# Patient Record
Sex: MALE | Race: WHITE | Employment: OTHER | URBAN - METROPOLITAN AREA
[De-identification: names, ages, dates, MRNs, and addresses within clinical notes are randomized per-mention and may not be internally consistent; named-entity substitution may affect disease eponyms.]

---

## 2017-03-02 RX ORDER — ACETAMINOPHEN 160 MG
5000 TABLET,DISINTEGRATING ORAL DAILY
COMMUNITY
End: 2019-06-03 | Stop reason: DRUGHIGH

## 2017-05-22 PROBLEM — Z79.4 TYPE 2 DIABETES MELLITUS WITH DIABETIC NEUROPATHY, WITH LONG-TERM CURRENT USE OF INSULIN (HCC): Status: ACTIVE | Noted: 2017-05-22

## 2017-05-22 PROBLEM — E11.40 TYPE 2 DIABETES MELLITUS WITH DIABETIC NEUROPATHY, WITH LONG-TERM CURRENT USE OF INSULIN (HCC): Status: ACTIVE | Noted: 2017-05-22

## 2017-05-26 ENCOUNTER — ANESTHESIA EVENT (OUTPATIENT)
Dept: SURGERY | Facility: HOSPITAL | Age: 72
End: 2017-05-26
Payer: MEDICARE

## 2017-06-01 ENCOUNTER — SURGERY (OUTPATIENT)
Age: 72
End: 2017-06-01

## 2017-06-01 ENCOUNTER — ANESTHESIA (OUTPATIENT)
Dept: SURGERY | Facility: HOSPITAL | Age: 72
End: 2017-06-01
Payer: MEDICARE

## 2017-06-01 ENCOUNTER — HOSPITAL ENCOUNTER (OUTPATIENT)
Facility: HOSPITAL | Age: 72
Setting detail: HOSPITAL OUTPATIENT SURGERY
Discharge: HOME | End: 2017-06-01
Attending: OTOLARYNGOLOGY | Admitting: OTOLARYNGOLOGY
Payer: MEDICARE

## 2017-06-01 VITALS
DIASTOLIC BLOOD PRESSURE: 80 MMHG | BODY MASS INDEX: 28.49 KG/M2 | HEART RATE: 59 BPM | WEIGHT: 188 LBS | RESPIRATION RATE: 20 BRPM | HEIGHT: 68 IN | OXYGEN SATURATION: 98 % | SYSTOLIC BLOOD PRESSURE: 127 MMHG | TEMPERATURE: 98 F

## 2017-06-01 DIAGNOSIS — H53.483 BILATERAL VISUAL FIELD CONSTRICTION: ICD-10-CM

## 2017-06-01 DIAGNOSIS — H02.831 DERMATOCHALASIS OF RIGHT UPPER EYELID: ICD-10-CM

## 2017-06-01 DIAGNOSIS — H02.834 DERMATOCHALASIS OF LEFT UPPER EYELID: ICD-10-CM

## 2017-06-01 PROCEDURE — 82962 GLUCOSE BLOOD TEST: CPT

## 2017-06-01 PROCEDURE — 080NXZZ ALTERATION OF RIGHT UPPER EYELID, EXTERNAL APPROACH: ICD-10-PCS | Performed by: OTOLARYNGOLOGY

## 2017-06-01 PROCEDURE — 080PXZZ ALTERATION OF LEFT UPPER EYELID, EXTERNAL APPROACH: ICD-10-PCS | Performed by: OTOLARYNGOLOGY

## 2017-06-01 RX ORDER — DEXTROSE MONOHYDRATE 25 G/50ML
50 INJECTION, SOLUTION INTRAVENOUS
Status: DISCONTINUED | OUTPATIENT
Start: 2017-06-01 | End: 2017-06-01

## 2017-06-01 RX ORDER — ONDANSETRON 2 MG/ML
4 INJECTION INTRAMUSCULAR; INTRAVENOUS AS NEEDED
Status: DISCONTINUED | OUTPATIENT
Start: 2017-06-01 | End: 2017-06-01

## 2017-06-01 RX ORDER — CEPHALEXIN 500 MG/1
500 CAPSULE ORAL 4 TIMES DAILY
Qty: 8 CAPSULE | Refills: 0 | Status: SHIPPED | OUTPATIENT
Start: 2017-06-01 | End: 2017-06-03

## 2017-06-01 RX ORDER — BACITRACIN 500 [USP'U]/G
OINTMENT OPHTHALMIC AS NEEDED
Status: DISCONTINUED | OUTPATIENT
Start: 2017-06-01 | End: 2017-06-01

## 2017-06-01 RX ORDER — DEXTROSE MONOHYDRATE 25 G/50ML
50 INJECTION, SOLUTION INTRAVENOUS
Status: DISCONTINUED | OUTPATIENT
Start: 2017-06-01 | End: 2017-06-01 | Stop reason: HOSPADM

## 2017-06-01 RX ORDER — NALOXONE HYDROCHLORIDE 0.4 MG/ML
80 INJECTION, SOLUTION INTRAMUSCULAR; INTRAVENOUS; SUBCUTANEOUS AS NEEDED
Status: DISCONTINUED | OUTPATIENT
Start: 2017-06-01 | End: 2017-06-01

## 2017-06-01 RX ORDER — MEPERIDINE HYDROCHLORIDE 25 MG/ML
12.5 INJECTION INTRAMUSCULAR; INTRAVENOUS; SUBCUTANEOUS AS NEEDED
Status: DISCONTINUED | OUTPATIENT
Start: 2017-06-01 | End: 2017-06-01

## 2017-06-01 RX ORDER — SODIUM CHLORIDE, SODIUM LACTATE, POTASSIUM CHLORIDE, CALCIUM CHLORIDE 600; 310; 30; 20 MG/100ML; MG/100ML; MG/100ML; MG/100ML
INJECTION, SOLUTION INTRAVENOUS CONTINUOUS
Status: DISCONTINUED | OUTPATIENT
Start: 2017-06-01 | End: 2017-06-01

## 2017-06-01 RX ORDER — LIDOCAINE HYDROCHLORIDE AND EPINEPHRINE 10; 10 MG/ML; UG/ML
INJECTION, SOLUTION INFILTRATION; PERINEURAL AS NEEDED
Status: DISCONTINUED | OUTPATIENT
Start: 2017-06-01 | End: 2017-06-01

## 2017-06-01 RX ORDER — OXYCODONE HYDROCHLORIDE AND ACETAMINOPHEN 5; 325 MG/1; MG/1
TABLET ORAL
Qty: 8 TABLET | Refills: 0 | Status: SHIPPED | OUTPATIENT
Start: 2017-06-01 | End: 2017-06-09 | Stop reason: ALTCHOICE

## 2017-06-01 RX ORDER — HYDROMORPHONE HYDROCHLORIDE 1 MG/ML
0.4 INJECTION, SOLUTION INTRAMUSCULAR; INTRAVENOUS; SUBCUTANEOUS EVERY 5 MIN PRN
Status: DISCONTINUED | OUTPATIENT
Start: 2017-06-01 | End: 2017-06-01

## 2017-06-01 RX ORDER — METOCLOPRAMIDE HYDROCHLORIDE 5 MG/ML
10 INJECTION INTRAMUSCULAR; INTRAVENOUS AS NEEDED
Status: DISCONTINUED | OUTPATIENT
Start: 2017-06-01 | End: 2017-06-01

## 2017-06-01 RX ORDER — HYDROCODONE BITARTRATE AND ACETAMINOPHEN 5; 325 MG/1; MG/1
2 TABLET ORAL AS NEEDED
Status: DISCONTINUED | OUTPATIENT
Start: 2017-06-01 | End: 2017-06-01

## 2017-06-01 RX ORDER — HYDROCODONE BITARTRATE AND ACETAMINOPHEN 5; 325 MG/1; MG/1
1 TABLET ORAL AS NEEDED
Status: DISCONTINUED | OUTPATIENT
Start: 2017-06-01 | End: 2017-06-01

## 2017-06-01 NOTE — OPERATIVE REPORT
[facility]    PATIENT'S NAME: Phong Seth   ATTENDING PHYSICIAN: Richard Perry M.D. OPERATING PHYSICIAN: Richard Perry M.D.    PATIENT ACCOUNT#:   [de-identified]    LOCATION:  [address] [place] [address] 2 EDWP 10  MEDICAL RECORD #:   [id number]       D using ophthalmologic Betadine.   Being on the left eye, I used an 11 blade to make my incision in the inner corner, and then once the incision was started, I changed to a 15 blade and completed the incision along the supratarsal crease and then the superior

## 2017-06-01 NOTE — ANESTHESIA POSTPROCEDURE EVALUATION
[address] Patient Status:  Hospital Outpatient Surgery   Age/Gender 66year old male MRN [id number]   [facility] SURGERY Attending Caitlin Ruth MD   Hosp Day # 0 PCP Phyllis Laureano MD       Anesthesia Post-op Note

## 2017-06-01 NOTE — ANESTHESIA PREPROCEDURE EVALUATION
PRE-OP EVALUATION    Patient Name: Paty Brandon    Pre-op Diagnosis: Bilateral visual field constriction [H53.483]  Dermatochalasis of left upper eyelid [Z38.514]  Dermatochalasis of right upper eyelid [N86.497]    Procedure(s):  BILATERAL UPPER BLEPHA left ventricular branch. 2.         Successful angioplasty and stenting of the right posterior descending artery.     Dictated By Ramiro Singer M.D.  W:     24/18/6984 15:21:38  t:      06/10/2016 07:58:46  [facility]   [id number]  CJG/    ECG Lab Results  Component Value Date    05/15/2017   K 4.6 05/15/2017    05/15/2017   CO2 21.9* 05/15/2017   BUN 17 05/15/2017   CREATSERUM 1.05 05/15/2017   * 05/15/2017            Airway      Mallampati: II  Mouth opening: <3 FB  TM

## 2017-06-01 NOTE — H&P
CHIEF COMPLAINT: Superior visual field cut.      HISTORY OF PRESENT ILLNESS: The patient is here today by the recommendation of his wife, who recommended that he visit with me for a complaint of loss of vision superiorly as well as laterally.  He thinks the RRR  Lungs: CTA       Eyes: He has MRD equal to 1 mm bilaterally. He has extra upper lid hanging over the edge of the lashes bilaterally.  His true lid margin is just above the 12-o'clock position of the pupil bilaterally.       ASSESSMENT & PLAN: Patient w

## 2017-06-01 NOTE — BRIEF OP NOTE
Pre-Operative Diagnosis: Bilateral visual field constriction [H53.483]  Dermatochalasis of left upper eyelid [H02.834]  Dermatochalasis of right upper eyelid [H02.831]     Post-Operative Diagnosis: Bilateral visual field constriction [H53.483]Dermatocha

## 2017-06-09 PROBLEM — H53.483 VISUAL FIELD CONSTRICTION, BILATERAL: Status: ACTIVE | Noted: 2017-06-09

## 2017-11-09 ENCOUNTER — APPOINTMENT (OUTPATIENT)
Dept: CV DIAGNOSTICS | Facility: HOSPITAL | Age: 72
DRG: 251 | End: 2017-11-09
Attending: HOSPITALIST
Payer: MEDICARE

## 2017-11-09 ENCOUNTER — APPOINTMENT (OUTPATIENT)
Dept: GENERAL RADIOLOGY | Facility: HOSPITAL | Age: 72
DRG: 251 | End: 2017-11-09
Payer: MEDICARE

## 2017-11-09 ENCOUNTER — HOSPITAL ENCOUNTER (OUTPATIENT)
Facility: HOSPITAL | Age: 72
Setting detail: OBSERVATION
Discharge: HOME | DRG: 251 | End: 2017-11-10
Attending: EMERGENCY MEDICINE | Admitting: HOSPITALIST
Payer: MEDICARE

## 2017-11-09 DIAGNOSIS — R55 SYNCOPE, NEAR: Primary | ICD-10-CM

## 2017-11-09 PROCEDURE — 85025 COMPLETE CBC W/AUTO DIFF WBC: CPT

## 2017-11-09 PROCEDURE — 84484 ASSAY OF TROPONIN QUANT: CPT | Performed by: HOSPITALIST

## 2017-11-09 PROCEDURE — 83036 HEMOGLOBIN GLYCOSYLATED A1C: CPT | Performed by: HOSPITALIST

## 2017-11-09 PROCEDURE — 85378 FIBRIN DEGRADE SEMIQUANT: CPT | Performed by: EMERGENCY MEDICINE

## 2017-11-09 PROCEDURE — 99285 EMERGENCY DEPT VISIT HI MDM: CPT

## 2017-11-09 PROCEDURE — 93005 ELECTROCARDIOGRAM TRACING: CPT

## 2017-11-09 PROCEDURE — 80053 COMPREHEN METABOLIC PANEL: CPT

## 2017-11-09 PROCEDURE — 87493 C DIFF AMPLIFIED PROBE: CPT | Performed by: HOSPITALIST

## 2017-11-09 PROCEDURE — 82962 GLUCOSE BLOOD TEST: CPT

## 2017-11-09 PROCEDURE — 71010 XR CHEST AP PORTABLE  (CPT=71010): CPT

## 2017-11-09 PROCEDURE — 84484 ASSAY OF TROPONIN QUANT: CPT

## 2017-11-09 PROCEDURE — 96360 HYDRATION IV INFUSION INIT: CPT

## 2017-11-09 PROCEDURE — 93010 ELECTROCARDIOGRAM REPORT: CPT

## 2017-11-09 PROCEDURE — 80053 COMPREHEN METABOLIC PANEL: CPT | Performed by: EMERGENCY MEDICINE

## 2017-11-09 PROCEDURE — 84484 ASSAY OF TROPONIN QUANT: CPT | Performed by: EMERGENCY MEDICINE

## 2017-11-09 PROCEDURE — 96372 THER/PROPH/DIAG INJ SC/IM: CPT

## 2017-11-09 PROCEDURE — 93306 TTE W/DOPPLER COMPLETE: CPT | Performed by: HOSPITALIST

## 2017-11-09 PROCEDURE — 83690 ASSAY OF LIPASE: CPT | Performed by: EMERGENCY MEDICINE

## 2017-11-09 PROCEDURE — 85025 COMPLETE CBC W/AUTO DIFF WBC: CPT | Performed by: EMERGENCY MEDICINE

## 2017-11-09 RX ORDER — DEXTROSE MONOHYDRATE 25 G/50ML
50 INJECTION, SOLUTION INTRAVENOUS
Status: DISCONTINUED | OUTPATIENT
Start: 2017-11-09 | End: 2017-11-10

## 2017-11-09 RX ORDER — PRAVASTATIN SODIUM 10 MG
10 TABLET ORAL NIGHTLY
Status: DISCONTINUED | OUTPATIENT
Start: 2017-11-09 | End: 2017-11-10

## 2017-11-09 RX ORDER — ACETAMINOPHEN 325 MG/1
650 TABLET ORAL EVERY 6 HOURS PRN
Status: DISCONTINUED | OUTPATIENT
Start: 2017-11-09 | End: 2017-11-10

## 2017-11-09 RX ORDER — AMLODIPINE BESYLATE 5 MG/1
10 TABLET ORAL DAILY
Status: DISCONTINUED | OUTPATIENT
Start: 2017-11-09 | End: 2017-11-10

## 2017-11-09 RX ORDER — ONDANSETRON 2 MG/ML
4 INJECTION INTRAMUSCULAR; INTRAVENOUS EVERY 6 HOURS PRN
Status: DISCONTINUED | OUTPATIENT
Start: 2017-11-09 | End: 2017-11-10

## 2017-11-09 RX ORDER — ENOXAPARIN SODIUM 100 MG/ML
40 INJECTION SUBCUTANEOUS DAILY
Status: DISCONTINUED | OUTPATIENT
Start: 2017-11-09 | End: 2017-11-10

## 2017-11-09 RX ORDER — ASPIRIN 81 MG/1
324 TABLET, CHEWABLE ORAL ONCE
Status: DISCONTINUED | OUTPATIENT
Start: 2017-11-09 | End: 2017-11-10

## 2017-11-09 RX ORDER — CLOPIDOGREL BISULFATE 75 MG/1
75 TABLET ORAL
Status: DISCONTINUED | OUTPATIENT
Start: 2017-11-09 | End: 2017-11-10

## 2017-11-09 RX ORDER — LOPERAMIDE HYDROCHLORIDE 2 MG/1
2 CAPSULE ORAL 4 TIMES DAILY PRN
Status: DISCONTINUED | OUTPATIENT
Start: 2017-11-09 | End: 2017-11-10

## 2017-11-09 RX ORDER — LISINOPRIL 40 MG/1
40 TABLET ORAL
Status: DISCONTINUED | OUTPATIENT
Start: 2017-11-09 | End: 2017-11-10

## 2017-11-09 RX ORDER — DIPHENHYDRAMINE HCL 25 MG
25 CAPSULE ORAL NIGHTLY PRN
Status: DISCONTINUED | OUTPATIENT
Start: 2017-11-09 | End: 2017-11-10

## 2017-11-09 RX ORDER — METOPROLOL TARTRATE 50 MG/1
50 TABLET, FILM COATED ORAL
Status: DISCONTINUED | OUTPATIENT
Start: 2017-11-09 | End: 2017-11-10

## 2017-11-09 NOTE — ED INITIAL ASSESSMENT (HPI)
Pt with several episodes of lightheadedness, sweating and diarrhea over yesterday and last noc. Denies chest pain, but may have tightness.

## 2017-11-09 NOTE — PROGRESS NOTES
Pt stated that he had a similar episode to what brought him to the ED. It occurred from 1425 to 1445 per pt.  Orthostatic BP obtained, WNL

## 2017-11-09 NOTE — PROGRESS NOTES
11/09/17 1705 11/09/17 1709 11/09/17 1712   Vital Signs   Pulse 55 68 --    /67 139/65 141/65   BP Location Right arm Right arm Right arm   BP Method Automatic Automatic Automatic   Patient Position Lying Sitting Standing     After episode of ligh

## 2017-11-09 NOTE — CONSULTS
[facility] Cardiology Consultation    Tonya Miller Patient Status:  Observation    1945 MRN [id number]   [facility] 8NE-A Attending Ray Holliday, [address] [facility] Day # 0 PCP Jaime Alexis MD     Reason for Consultation:  Near syncope      Hi glasses     Past Surgical History:  No date: ANGIOGRAM  No date: ANGIOPLASTY (CORONARY)  No date: BYPASS SURGERY  8/31/2001: CABG      Comment: x 3-4  No date: CATH BARE METAL STENT (BMS)      Comment: x 8 stents total combination of bare metal and he does not drink alcohol or use drugs. Review of Systems:  All systems were reviewed and are negative except as described above in HPI.     Physical Exam:      Wt Readings from Last 3 Encounters:  11/09/17 : 180 lb (81.6 kg)  06/01/17 : 188 lb (85.3 kg) RFA     Cath /PCI 6/2016:        RESULTS:     PTCA/STENT MID PLVB , 95% TO 0% WITH 2.5 X 20 MM PROMUS DE STENT      PTCA/STENT RPDA, 99% TO 0% WITH 2.25 X 16 MM PROMUS DE STENT         Findings     LVEF: 60%     LM: NORMAL     LCx: 1005 PROX     LAD: 100%

## 2017-11-09 NOTE — PLAN OF CARE
Admit from the ED with near syncope. Had 4 episodes of diarrhea at home. Pt alert and oriented. On room air. Up ad yesi. Stool negative for c diff. Orthostatic negative. 2D echo completed, EF 60%. Possible d/c tomorrow. Will continue to monitor.

## 2017-11-09 NOTE — H&P
[facility] Hospitalist History and Physical      Patient presents with:   Other       PCP: Silvino Crowe MD      History of Present Illness: Patient is a 67year old male with PMH sig for CAD s/p cabg and PCI, HTN, HL, gerd, and DM2,  Who presents to ER with pre-sy ENDOSCOPY  No date: COLONOSCOPY  5/13/2015: LAPAROSCOPIC CHOLECYSTECTOMY N/A      Comment: Procedure: LAPAROSCOPIC CHOLECYSTECTOMY;                 Surgeon: Margarita Izaguirre;   Location: [facility] MAIN OR  2008: OTHER SURGICAL HISTORY      Comment: ercp x 3 with rem bilaterally. Normal effort   Chest wall:  No tenderness or deformity   Heart:  Regular rate and rhythm, S1, S2 normal, no murmur, rub or gallop appreciated   Abdomen:   Soft, non-tender. Bowel sounds normal. No masses,  No organomegaly.  Non distended   Ext Assessment/Plan:     #Pre-syncope  -on tele  -echo pending  -trop wnl  -cards following  -may be 2/2 dehydration vs vasovagal episode  -S/p IVF, encourage fluids oral    #Soft stool?  Diarrhea  -check cdiff if recurs  -possible viral syndrome vs diet re

## 2017-11-09 NOTE — ED PROVIDER NOTES
Patient Seen in: [facility] Emergency Department    History   Patient presents with:   Other    Stated Complaint:     HPI    The patient is a 67-year-old male with an extensive cardiac history including CABG in the distant past, and 8 stents, who is diverticulosis, int hem  4/2012: COLONOSCOPY      Comment: diverticulosis  7/23/15: COLONOSCOPY      Comment: diverticulosis and enlarged internal                hemorrhoids  7/23/2015: COLONOSCOPY N/A      Comment: Procedure: COLONOSCOPY;  Surgeon: Brenda Baum without any distress or retractions. Clear to auscultation bilaterally no wheezes or rales  Abdomen: Normoactive bowel sounds. Soft, nondistended. No HSM or masses. Nontender throughout abdomen. No CVA tenderness.   Extremities: No deformity, nontender thro axes as noted on EKG Report.   Rate: 68  Rhythm: Sinus Rhythm  Reading: Normal sinus rhythm with first-degree AV block with SC interval 222 ms, QRS duration 110 ms, with a right bundle branch block, normal axis, without any ST segment elevations or depressi

## 2017-11-10 VITALS
WEIGHT: 180 LBS | HEIGHT: 68 IN | DIASTOLIC BLOOD PRESSURE: 61 MMHG | SYSTOLIC BLOOD PRESSURE: 144 MMHG | RESPIRATION RATE: 16 BRPM | OXYGEN SATURATION: 96 % | HEART RATE: 83 BPM | BODY MASS INDEX: 27.28 KG/M2 | TEMPERATURE: 98 F

## 2017-11-10 PROCEDURE — 80048 BASIC METABOLIC PNL TOTAL CA: CPT | Performed by: HOSPITALIST

## 2017-11-10 PROCEDURE — 83735 ASSAY OF MAGNESIUM: CPT | Performed by: HOSPITALIST

## 2017-11-10 PROCEDURE — 82962 GLUCOSE BLOOD TEST: CPT

## 2017-11-10 PROCEDURE — 96372 THER/PROPH/DIAG INJ SC/IM: CPT

## 2017-11-10 PROCEDURE — 85025 COMPLETE CBC W/AUTO DIFF WBC: CPT | Performed by: HOSPITALIST

## 2017-11-10 RX ORDER — METOPROLOL TARTRATE 50 MG/1
50 TABLET, FILM COATED ORAL
Status: DISCONTINUED | OUTPATIENT
Start: 2017-11-10 | End: 2017-11-10

## 2017-11-10 NOTE — PROGRESS NOTES
[facility] Cardiology Progress Note        Young Duty Patient Status:  Observation    1945 MRN [id number]   [facility] 8NE-A Attending Maria Alejandra Curran, DO   Hosp Day # 0 PCP Carol López MD     Subjective: 144/61      Temp: 97.5 °F (36.4 °C)  Pulse: 83  Resp: 16  BP: 144/61  General:  Appears comfortable  HEENT: No focal deficits. Neck: No JVD, carotids 2+ no bruits. Cardiac: Regular S1S2. No S3, S4, rub, click. No murmur.   Lungs: Clear to auscultation a new     CXR, 11/9/2017:       Cath/PCI 5/2016:     Cardiac Cath: 5/31/16:     Procedure: LHC, CORONARY ANGIO, LV ANGIO, SVG ANGIO, LIMA ANGIO, IC ADENOSINE, IC NTG, CRISTÓBAL SVG ---> DIAG, ANGIOMAX,   PERCLOSE RFA     Cath /PCI 6/2016:        RESULTS:     PTCA/

## 2017-11-10 NOTE — PROGRESS NOTES
NURSING DISCHARGE NOTE    Discharged Home via Ambulatory. Accompanied by RN  Belongings Taken by patient/family. Pt alert and oriented. IV and tele removed. Discharge instructions and handouts given and discussed.  Medications and future appointment

## 2017-11-10 NOTE — DISCHARGE SUMMARY
General Medicine Discharge Summary     Patient ID:  Chrystal Oppenheim  67year old  7/18/1945    Admit date: 11/9/2017    Discharge date and time:11/10/17    Attending Physician: DO Bess Russo daily. AmLODIPine Besylate 5 MG Oral Tab  Take 10 mg by mouth daily.       Metoprolol Tartrate 100 MG Oral Tab  1/2 TABLET TWICE DAILY    Lovastatin 20 MG Oral Tab  1/2 TABLET DAILY AT breakfast    Lisinopril 40 MG Oral Tab  1 TABLET DAILY    NOVOLOG 100

## 2017-11-10 NOTE — PLAN OF CARE
CARDIOVASCULAR - ADULT    • Maintains optimal cardiac output and hemodynamic stability Progressing    • Absence of cardiac arrhythmias or at baseline Progressing        Tele monitoring. I/o. Gluco scan qid. Pt ambulating in hallway without difficulty.  Svetlana Madrigal

## 2017-11-10 NOTE — PLAN OF CARE
CARDIOVASCULAR - ADULT    • Maintains optimal cardiac output and hemodynamic stability Progressing    • Absence of cardiac arrhythmias or at baseline Progressing    Pt on tele, NSR on monitor. EF 60% on echo yesterday. Pt alert and oriented.  On room

## 2017-11-11 ENCOUNTER — HOSPITAL ENCOUNTER (INPATIENT)
Facility: HOSPITAL | Age: 72
LOS: 3 days | Discharge: HOME | DRG: 251 | End: 2017-11-14
Attending: EMERGENCY MEDICINE | Admitting: INTERNAL MEDICINE
Payer: MEDICARE

## 2017-11-11 ENCOUNTER — APPOINTMENT (OUTPATIENT)
Dept: GENERAL RADIOLOGY | Facility: HOSPITAL | Age: 72
DRG: 251 | End: 2017-11-11
Attending: EMERGENCY MEDICINE
Payer: MEDICARE

## 2017-11-11 DIAGNOSIS — R07.9 ACUTE CHEST PAIN: Primary | ICD-10-CM

## 2017-11-11 PROCEDURE — 93010 ELECTROCARDIOGRAM REPORT: CPT

## 2017-11-11 PROCEDURE — 84484 ASSAY OF TROPONIN QUANT: CPT | Performed by: EMERGENCY MEDICINE

## 2017-11-11 PROCEDURE — 99285 EMERGENCY DEPT VISIT HI MDM: CPT

## 2017-11-11 PROCEDURE — 85610 PROTHROMBIN TIME: CPT | Performed by: EMERGENCY MEDICINE

## 2017-11-11 PROCEDURE — 85025 COMPLETE CBC W/AUTO DIFF WBC: CPT | Performed by: EMERGENCY MEDICINE

## 2017-11-11 PROCEDURE — 82962 GLUCOSE BLOOD TEST: CPT

## 2017-11-11 PROCEDURE — 36415 COLL VENOUS BLD VENIPUNCTURE: CPT

## 2017-11-11 PROCEDURE — 83880 ASSAY OF NATRIURETIC PEPTIDE: CPT | Performed by: EMERGENCY MEDICINE

## 2017-11-11 PROCEDURE — 83036 HEMOGLOBIN GLYCOSYLATED A1C: CPT | Performed by: INTERNAL MEDICINE

## 2017-11-11 PROCEDURE — 93005 ELECTROCARDIOGRAM TRACING: CPT

## 2017-11-11 PROCEDURE — 80053 COMPREHEN METABOLIC PANEL: CPT | Performed by: EMERGENCY MEDICINE

## 2017-11-11 PROCEDURE — 85730 THROMBOPLASTIN TIME PARTIAL: CPT | Performed by: EMERGENCY MEDICINE

## 2017-11-11 PROCEDURE — 71010 XR CHEST AP PORTABLE  (CPT=71010): CPT | Performed by: EMERGENCY MEDICINE

## 2017-11-11 RX ORDER — ASPIRIN 81 MG/1
81 TABLET, CHEWABLE ORAL DAILY
Status: DISCONTINUED | OUTPATIENT
Start: 2017-11-11 | End: 2017-11-14

## 2017-11-11 RX ORDER — DIPHENHYDRAMINE HCL 25 MG
25 CAPSULE ORAL NIGHTLY PRN
Status: DISCONTINUED | OUTPATIENT
Start: 2017-11-11 | End: 2017-11-14

## 2017-11-11 RX ORDER — DEXTROSE MONOHYDRATE 25 G/50ML
50 INJECTION, SOLUTION INTRAVENOUS
Status: DISCONTINUED | OUTPATIENT
Start: 2017-11-11 | End: 2017-11-12

## 2017-11-11 RX ORDER — LISINOPRIL 40 MG/1
40 TABLET ORAL
Status: DISCONTINUED | OUTPATIENT
Start: 2017-11-11 | End: 2017-11-14

## 2017-11-11 RX ORDER — METOPROLOL TARTRATE 50 MG/1
50 TABLET, FILM COATED ORAL
Status: DISCONTINUED | OUTPATIENT
Start: 2017-11-11 | End: 2017-11-14

## 2017-11-11 RX ORDER — CLOPIDOGREL BISULFATE 75 MG/1
75 TABLET ORAL
Status: DISCONTINUED | OUTPATIENT
Start: 2017-11-11 | End: 2017-11-14

## 2017-11-11 RX ORDER — AMLODIPINE BESYLATE 5 MG/1
10 TABLET ORAL DAILY
Status: DISCONTINUED | OUTPATIENT
Start: 2017-11-11 | End: 2017-11-14

## 2017-11-11 RX ORDER — ACETAMINOPHEN 325 MG/1
650 TABLET ORAL EVERY 6 HOURS PRN
Status: DISCONTINUED | OUTPATIENT
Start: 2017-11-11 | End: 2017-11-14

## 2017-11-11 RX ORDER — PRAVASTATIN SODIUM 10 MG
10 TABLET ORAL NIGHTLY
Status: DISCONTINUED | OUTPATIENT
Start: 2017-11-11 | End: 2017-11-14

## 2017-11-11 RX ORDER — INSULIN ASPART 100 [IU]/ML
15 INJECTION, SOLUTION INTRAVENOUS; SUBCUTANEOUS
Status: DISCONTINUED | OUTPATIENT
Start: 2017-11-12 | End: 2017-11-14

## 2017-11-11 NOTE — ED PROVIDER NOTES
Patient Seen in: [facility] Emergency Department    History   Patient presents with:  Dizziness (neurologic)    Stated Complaint: dizzy lightheaded was admitted just d/c with holter monitor     HPI    43-year-old male presents emergency room with ch History:  No date: ANGIOGRAM  No date: ANGIOPLASTY (CORONARY)  No date: BYPASS SURGERY  8/31/2001: CABG      Comment: x 3-4  No date: CATH BARE METAL STENT (BMS)      Comment: x 8 stents total combination of bare metal and               drug eluting  2006: Exam    GENERAL: Patient is awake, alert, well-appearing, in no acute distress. HEENT:  no scleral icterus. Mucous membranes are moist, oropharynx is clear, uvula midline. Scalp is atraumatic. NECK: Neck is supple, there is no nuchal rigidity.   No galeas -----------         ------                     CBC W/ DIFFERENTIAL[[id number]]          Abnormal            Final result                 Please view results for these tests on the individual orders.    [address]

## 2017-11-11 NOTE — ED INITIAL ASSESSMENT (HPI)
Last night was \"lightheaded, broke out in sweats, and felt like elephant sitting on chest\" lasted for 20 minutes. Had another episode today at 1:30 pm that lasted for 20 minutes.  Was told to come to ER for eval. Recently discharged and given a holter mon

## 2017-11-11 NOTE — ED NOTES
Report given to QUINTON Solano. Transport paged. Pt in no distress. Pt updated and plan of care discussed.

## 2017-11-11 NOTE — ED NOTES
Portable chest in process. Report received from QUINTON Stahl. Pt in no distress, vitals are stable.

## 2017-11-12 PROCEDURE — 80048 BASIC METABOLIC PNL TOTAL CA: CPT | Performed by: INTERNAL MEDICINE

## 2017-11-12 PROCEDURE — 83735 ASSAY OF MAGNESIUM: CPT | Performed by: INTERNAL MEDICINE

## 2017-11-12 PROCEDURE — 85025 COMPLETE CBC W/AUTO DIFF WBC: CPT | Performed by: INTERNAL MEDICINE

## 2017-11-12 PROCEDURE — 84484 ASSAY OF TROPONIN QUANT: CPT | Performed by: INTERNAL MEDICINE

## 2017-11-12 PROCEDURE — 82962 GLUCOSE BLOOD TEST: CPT

## 2017-11-12 RX ORDER — MIRTAZAPINE 15 MG/1
15 TABLET, FILM COATED ORAL NIGHTLY
Status: DISCONTINUED | OUTPATIENT
Start: 2017-11-12 | End: 2017-11-14

## 2017-11-12 RX ORDER — SODIUM CHLORIDE 9 MG/ML
INJECTION, SOLUTION INTRAVENOUS CONTINUOUS
Status: DISCONTINUED | OUTPATIENT
Start: 2017-11-12 | End: 2017-11-13

## 2017-11-12 RX ORDER — DEXTROSE MONOHYDRATE 25 G/50ML
50 INJECTION, SOLUTION INTRAVENOUS
Status: DISCONTINUED | OUTPATIENT
Start: 2017-11-12 | End: 2017-11-14

## 2017-11-12 RX ORDER — SODIUM PHOSPHATE, DIBASIC AND SODIUM PHOSPHATE, MONOBASIC 7; 19 G/133ML; G/133ML
1 ENEMA RECTAL ONCE AS NEEDED
Status: COMPLETED | OUTPATIENT
Start: 2017-11-12 | End: 2017-11-12

## 2017-11-12 NOTE — PLAN OF CARE
CARDIOVASCULAR - ADULT    • Absence of cardiac arrhythmias or at baseline Progressing        Patient/Family Goals    • Patient/Family Long Term Goal Progressing    • Patient/Family Short Term Goal Progressing        Cardiac monitor shows. ... SR/SB  Alert an

## 2017-11-12 NOTE — PROGRESS NOTES
[facility] Hospitalist Progress Note                                                                   [address]  7/18/1945    SUBJECTIVE:  He is cp free. Denies sob. Sitting up.       OBJE Continuous Infusions:   • sodium chloride       PRN: glucose **OR** Glucose-Vitamin C **OR** dextrose 50% **OR** glucose **OR** Glucose-Vitamin C, acetaminophen, diphenhydrAMINE    Assessment/Plan:  Principal Problem:    Acute chest pain        # chest

## 2017-11-12 NOTE — H&P
[facility] Hospitalist History and Physical      Patient presents with:  Dizziness (neurologic)       PCP: Sonja Veliz MD      History of Present Illness: 67year old male with PMH sig for CAD s/p cabg and PCI, HTN, HL, gerd, and DM2,  Who presents to ER with ch COLONOSCOPY N/A      Comment: Procedure: COLONOSCOPY;  Surgeon: Chadwick Ayon MD;  Location: [address] [place] [address]  No date: COLONOSCOPY  5/13/2015: LAPAROSCOPIC CHOLECYSTECTOMY N/A      Comment: Procedure: LAPAROSCOPIC CHOLECYSTECTOMY; Normocephalic, without obvious abnormality, atraumatic. Eyes:  Sclera anicteric, No conjunctival pallor, EOMs intact. Nose: Nares normal. Septum midline. Mucosa normal. No drainage.    Throat: Lips, mucosa, and tongue normal. Teeth and gums normal.   N chest.  Recently given a holter monitor yesterday. CONCLUSION:  Multiple old left-sided rib fractures. There is pleural-parenchymal opacity in the left lung laterally. Atelectasis/scarring retrocardiac left lung base has increased slightly.   Heart

## 2017-11-12 NOTE — CONSULTS
[facility] Card[facility]  Consultation Note      Tonya Lamonttripp Patient Status:  Observation    1945 MRN [id number]   [facility] 8NE-A Attending Tomi Salgado MD   Hosp Day # 0 PCP Jaime Alexis MD     ADDENDUM:  The pa artery.    An angiogram in August 2007, done because of chest discomfort   demonstrated an occluded obtuse marginal artery that fills by collaterals during RCA injections, nonobstructive plaquing in the ramus intermedius, occluded LAD which has a patent VALDEZ oral liquid 30 g 30 g Oral Q15 Min PRN   Or      Glucose-Vitamin C (DEX-4) 4-0.006 g chewable tab 8 tablet 8 tablet Oral Q15 Min PRN   Insulin Aspart Pen (NOVOLOG) 100 UNIT/ML flexpen 4-20 Units 4-20 Units Subcutaneous TID CC and HS   diphenhydrAMINE (KELLY removal of pancreatic duct                stones  No date: TONSILLECTOMY    Family History  family history includes Cancer in his father and mother; Diabetes in his brother; Heart Disorder in his father and mother.     Social History   reports that he quit normally  Psychiatric: Normal mood and affect; answers questions appropriately  Dermatologic: No rashes; normal skin turgor    Diagnostic testing:    EKG: SB with 1st degree AV block, RBBB and left atrial enlargement    Labs:     Lab Results  Component Kenna

## 2017-11-12 NOTE — PLAN OF CARE
CARDIOVASCULAR - ADULT    • Absence of cardiac arrhythmias or at baseline Progressing          Assumed care of pt at 0700. Denies any chest pain or SOB. Sinus maine HR 50s to sinus rhythm. Saturating well on RA. Plan for angiogram 11/13. Consent signed.

## 2017-11-13 ENCOUNTER — APPOINTMENT (OUTPATIENT)
Dept: INTERVENTIONAL RADIOLOGY/VASCULAR | Facility: HOSPITAL | Age: 72
DRG: 251 | End: 2017-11-13
Attending: NURSE PRACTITIONER
Payer: MEDICARE

## 2017-11-13 PROCEDURE — 85610 PROTHROMBIN TIME: CPT | Performed by: INTERNAL MEDICINE

## 2017-11-13 PROCEDURE — B2121ZZ FLUOROSCOPY OF SINGLE CORONARY ARTERY BYPASS GRAFT USING LOW OSMOLAR CONTRAST: ICD-10-PCS | Performed by: INTERNAL MEDICINE

## 2017-11-13 PROCEDURE — 99152 MOD SED SAME PHYS/QHP 5/>YRS: CPT

## 2017-11-13 PROCEDURE — 99153 MOD SED SAME PHYS/QHP EA: CPT

## 2017-11-13 PROCEDURE — B2111ZZ FLUOROSCOPY OF MULTIPLE CORONARY ARTERIES USING LOW OSMOLAR CONTRAST: ICD-10-PCS | Performed by: INTERNAL MEDICINE

## 2017-11-13 PROCEDURE — 93005 ELECTROCARDIOGRAM TRACING: CPT

## 2017-11-13 PROCEDURE — B2181ZZ FLUOROSCOPY OF LEFT INTERNAL MAMMARY BYPASS GRAFT USING LOW OSMOLAR CONTRAST: ICD-10-PCS | Performed by: INTERNAL MEDICINE

## 2017-11-13 PROCEDURE — 3E033GC INTRODUCTION OF OTHER THERAPEUTIC SUBSTANCE INTO PERIPHERAL VEIN, PERCUTANEOUS APPROACH: ICD-10-PCS | Performed by: INTERNAL MEDICINE

## 2017-11-13 PROCEDURE — 02703ZZ DILATION OF CORONARY ARTERY, ONE ARTERY, PERCUTANEOUS APPROACH: ICD-10-PCS | Performed by: INTERNAL MEDICINE

## 2017-11-13 PROCEDURE — 93459 L HRT ART/GRFT ANGIO: CPT

## 2017-11-13 PROCEDURE — 85730 THROMBOPLASTIN TIME PARTIAL: CPT | Performed by: INTERNAL MEDICINE

## 2017-11-13 PROCEDURE — 4A023N7 MEASUREMENT OF CARDIAC SAMPLING AND PRESSURE, LEFT HEART, PERCUTANEOUS APPROACH: ICD-10-PCS | Performed by: INTERNAL MEDICINE

## 2017-11-13 PROCEDURE — 93010 ELECTROCARDIOGRAM REPORT: CPT | Performed by: INTERNAL MEDICINE

## 2017-11-13 PROCEDURE — B2151ZZ FLUOROSCOPY OF LEFT HEART USING LOW OSMOLAR CONTRAST: ICD-10-PCS | Performed by: INTERNAL MEDICINE

## 2017-11-13 PROCEDURE — 82962 GLUCOSE BLOOD TEST: CPT

## 2017-11-13 RX ORDER — LIDOCAINE HYDROCHLORIDE 10 MG/ML
INJECTION, SOLUTION INFILTRATION; PERINEURAL
Status: COMPLETED
Start: 2017-11-13 | End: 2017-11-13

## 2017-11-13 RX ORDER — MIDAZOLAM HYDROCHLORIDE 1 MG/ML
INJECTION INTRAMUSCULAR; INTRAVENOUS
Status: COMPLETED
Start: 2017-11-13 | End: 2017-11-13

## 2017-11-13 RX ORDER — HEPARIN SODIUM 5000 [USP'U]/ML
INJECTION, SOLUTION INTRAVENOUS; SUBCUTANEOUS
Status: COMPLETED
Start: 2017-11-13 | End: 2017-11-13

## 2017-11-13 RX ORDER — SODIUM CHLORIDE 9 MG/ML
INJECTION, SOLUTION INTRAVENOUS CONTINUOUS
Status: DISCONTINUED | OUTPATIENT
Start: 2017-11-13 | End: 2017-11-14

## 2017-11-13 RX ORDER — CLOPIDOGREL BISULFATE 75 MG/1
75 TABLET ORAL DAILY
Status: DISCONTINUED | OUTPATIENT
Start: 2017-11-14 | End: 2017-11-13

## 2017-11-13 RX ORDER — BISACODYL 10 MG
10 SUPPOSITORY, RECTAL RECTAL
Status: DISCONTINUED | OUTPATIENT
Start: 2017-11-13 | End: 2017-11-14

## 2017-11-13 RX ORDER — SODIUM CHLORIDE 9 MG/ML
INJECTION, SOLUTION INTRAVENOUS CONTINUOUS
Status: ACTIVE | OUTPATIENT
Start: 2017-11-13 | End: 2017-11-13

## 2017-11-13 NOTE — PROGRESS NOTES
[facility] Hospitalist Progress Note                                                                   [address]  7/18/1945    SUBJECTIVE:  Doing well. No cp/sob/dizziness.  Most major compla Subcutaneous TID CC   • Clopidogrel Bisulfate  75 mg Oral Daily     Continuous Infusions:   • sodium chloride 20 mL/hr at 11/13/17 6784   • sodium chloride 125 mL/hr at 11/13/17 1221   • sodium chloride       PRN: glucose **OR** Glucose-Vitamin C **OR** de

## 2017-11-13 NOTE — PROGRESS NOTES
[facility] Progress Note        Skinny Baronover Patient Status:  Inpatient    1945 MRN [id number]   [facility] 8NE-A Attending Tanner Chavez MD   [facility] Day # 2 PCP Silvino Crowe MD     Subjective murmur. Lungs: Clear to auscultation and percussion. Abdomen: Soft, non-tender. Extremities: No LE edema. No clubbing or cyanosis. Neurologic: Alert and oriented, normal affect. Skin: Warm and dry.            LABS:      HEM:  Recent Labs   Lab  11/ [facility] RFA     Cath /PCI 6/2016:        RESULTS:     PTCA/STENT MID PLVB , 95% TO 0% WITH 2.5 X 20 MM PROMUS DE STENT      PTCA/STENT RPDA, 99% TO 0% WITH 2.25 X 16 MM PROMUS DE STENT         Findings     LVEF: 60%     LM: NORMAL     LCx: 1005 PROX

## 2017-11-13 NOTE — PLAN OF CARE
Problem: Patient/Family Goals  Goal: Patient/Family Short Term Goal  Patient's Short Term Goal: Relief of constipation    Interventions:   - Encourage ambulation  - Fleets enema  Outcome: Not Progressing

## 2017-11-13 NOTE — PLAN OF CARE
Problem: Patient/Family Goals  Goal: Patient/Family Long Term Goal  Patient's Long Term Goal: Remain out of the hospital.    Interventions:  - Follow up with MD appointments. - Take medications as prescribed.    Outcome: Progressing    Goal: Patient/Fami

## 2017-11-13 NOTE — PLAN OF CARE
Assumed care of patient at 1900. Patient A/Ox3, very pleasant. Recently admitted 11/9 for presyncope related to loose stools. D/C 11/10 with holter monitor.   Patient returned Sat after complaints of chest pain, like an elephant on his chest associated w

## 2017-11-13 NOTE — PROCEDURES
[facility]    PATIENT'S NAME: Sandra Poser   ATTENDING PHYSICIAN: Ivonne Szymanski M.D. OPERATING PHYSICIAN: Stacie Grady M.D.    PATIENT ACCOUNT#:   [de-identified]    LOCATION:  [address] [place] [address]  MEDICAL RECORD #:   [id number]       DATE OF circumflex artery is 100% occluded at its ostium. There is a small to medium size ramus intermedius artery which is free of any high-grade stenoses. The LAD is a small to medium size vessel which has been stented proximally.   It gives rise to a medium si the lesion. After the final balloon inflation with a 2.5 mm balloon, postprocedural angiography demonstrated that the proximal LAD stenosis had been improved from 90% to 35% with good runoff and no dissection. IMPRESSION:    1.    Normal left ventricula

## 2017-11-13 NOTE — PLAN OF CARE
Problem: Patient/Family Goals  Goal: Patient/Family Long Term Goal  Patient's Long Term Goal: Remain out of the hospital.    Interventions:  - Follow up with MD appointments. - Take medications as prescribed.   Outcome: Progressing

## 2017-11-14 VITALS
RESPIRATION RATE: 18 BRPM | WEIGHT: 180.13 LBS | SYSTOLIC BLOOD PRESSURE: 136 MMHG | HEART RATE: 67 BPM | DIASTOLIC BLOOD PRESSURE: 67 MMHG | HEIGHT: 68 IN | TEMPERATURE: 99 F | OXYGEN SATURATION: 90 % | BODY MASS INDEX: 27.3 KG/M2

## 2017-11-14 PROCEDURE — 93010 ELECTROCARDIOGRAM REPORT: CPT | Performed by: INTERNAL MEDICINE

## 2017-11-14 PROCEDURE — 80048 BASIC METABOLIC PNL TOTAL CA: CPT | Performed by: INTERNAL MEDICINE

## 2017-11-14 PROCEDURE — 82962 GLUCOSE BLOOD TEST: CPT

## 2017-11-14 PROCEDURE — 93005 ELECTROCARDIOGRAM TRACING: CPT

## 2017-11-14 PROCEDURE — 85025 COMPLETE CBC W/AUTO DIFF WBC: CPT | Performed by: INTERNAL MEDICINE

## 2017-11-14 NOTE — PROGRESS NOTES
Cardiac cath today. Balloon angioplasty to restenosis  of LAD via right groin. Puncture site closed with perclose. Right groin site soft, dressing clean dry, intact. Has walked in halls and tolerated well. Tele SB with 1 st degree ABV. VSS.   Concerned abou

## 2017-11-14 NOTE — PROGRESS NOTES
[facility] Progress Note        Sanjiv May Patient Status:  Inpatient    1945 MRN [id number]   [facility] 8NE-A Attending Shana Lesches, MD   [facility] Day # 3 PCP Ravindra Gómez MD     Subjective S1S2.  No S3, S4, rub, click. No murmur. Lungs: Clear to auscultation and percussion. Abdomen: Soft, non-tender. Extremities: No LE edema. No clubbing or cyanosis. No hematoma RFA site  Neurologic: Alert and oriented, normal affect.   Skin: Warm and 5/2016:     Cardiac Cath: 5/31/16:     Procedure: LHC, CORONARY ANGIO, LV ANGIO, SVG ANGIO, LIMA ANGIO, IC ADENOSINE, IC NTG, CRISTÓBAL SVG ---> DIAG, ANGIOMAX,   PERCLOSE RFA     Cath /PCI 6/2016:        RESULTS:     PTCA/STENT MID PLVB , 95% TO 0% WITH 2.5 X 2 Diabetes     4. Hypertension        Plan:      Home today.   Asa, plavix  TTM  See me in 2 weeks  Empiric antacid Rx: TUMS, mylanta or prilosec          Nurys Lara  11/13/2017  9:00 AM

## 2017-11-14 NOTE — PROGRESS NOTES
IV and tele discontinued. Pt. Received discharge instructions and follow-up information as well as activity instructions for discharge. Dressing removed from R groin - appears CDI. Questions addressed and answered. Pt.  To be transported by wheelchair with

## 2017-11-14 NOTE — CARDIAC REHAB
Patient sleepy. Limited education provided to patient. He states he will call for his cardiac rehab appointment.

## 2017-11-14 NOTE — DIETARY NOTE
Nutrition Short Note    Dietitian consult received per cardiac rehab/CHF protocol. Pt to be educated by cardiac rehab staff and encouraged to attend outpatient classes taught by SIMONA. RD available PRN.     Rosaura Gandara RD, LDN  Pager #[phone]

## 2017-11-15 NOTE — DISCHARGE SUMMARY
[facility] Discharge Summary    Patient ID:  Jef Contreras  [id number]  62 year old  7/18/1945    Admit date: 11/11/2017  Discharge date and time: 11/14/17  Attending Physician: Cr Gao MD  Primary Care Physician: MD ALBERTO Carrillo oriented  CV: RRR, +s1/s2  Lungs: CTAB, good respiratory effort  Abdomen: s/nt/nd  Ext: Moves all 4 extremities, no c/c/e  Neuro: CN Intact, no focal deficits    Discharge meds     Medication List      CONTINUE taking these medications    AmLODIPine Besyla Ap Portable  (cpt=71010)    Result Date: 11/9/2017  PROCEDURE:  XR CHEST AP PORTABLE (CPT=71010)  TECHNIQUE:  AP chest radiograph was obtained. COMPARISON:  [facility] , XR CHEST PA + LAT CHEST (CPT=71020), 5/18/2015, 17:48.   INDICATIONS:  chest pain  PATIENT

## 2017-12-08 ENCOUNTER — CARDPULM VISIT (OUTPATIENT)
Dept: CARDIAC REHAB | Facility: HOSPITAL | Age: 72
End: 2017-12-08
Attending: INTERNAL MEDICINE
Payer: MEDICARE

## 2017-12-08 VITALS
HEART RATE: 60 BPM | OXYGEN SATURATION: 95 % | WEIGHT: 182 LBS | DIASTOLIC BLOOD PRESSURE: 64 MMHG | SYSTOLIC BLOOD PRESSURE: 106 MMHG | HEIGHT: 68 IN | BODY MASS INDEX: 27.58 KG/M2

## 2017-12-08 PROCEDURE — 93798 PHYS/QHP OP CAR RHAB W/ECG: CPT

## 2017-12-15 ENCOUNTER — CARDPULM VISIT (OUTPATIENT)
Dept: CARDIAC REHAB | Facility: HOSPITAL | Age: 72
End: 2017-12-15
Attending: INTERNAL MEDICINE
Payer: MEDICARE

## 2017-12-15 PROCEDURE — 93798 PHYS/QHP OP CAR RHAB W/ECG: CPT

## 2017-12-18 ENCOUNTER — CARDPULM VISIT (OUTPATIENT)
Dept: CARDIAC REHAB | Facility: HOSPITAL | Age: 72
End: 2017-12-18
Attending: INTERNAL MEDICINE
Payer: MEDICARE

## 2017-12-18 PROCEDURE — 93798 PHYS/QHP OP CAR RHAB W/ECG: CPT

## 2017-12-20 ENCOUNTER — CARDPULM VISIT (OUTPATIENT)
Dept: CARDIAC REHAB | Facility: HOSPITAL | Age: 72
End: 2017-12-20
Attending: INTERNAL MEDICINE
Payer: MEDICARE

## 2017-12-20 PROCEDURE — 93798 PHYS/QHP OP CAR RHAB W/ECG: CPT

## 2017-12-22 ENCOUNTER — CARDPULM VISIT (OUTPATIENT)
Dept: CARDIAC REHAB | Facility: HOSPITAL | Age: 72
End: 2017-12-22
Attending: INTERNAL MEDICINE
Payer: MEDICARE

## 2017-12-22 PROCEDURE — 93798 PHYS/QHP OP CAR RHAB W/ECG: CPT

## 2017-12-25 ENCOUNTER — APPOINTMENT (OUTPATIENT)
Dept: CARDIAC REHAB | Facility: HOSPITAL | Age: 72
End: 2017-12-25
Attending: INTERNAL MEDICINE
Payer: MEDICARE

## 2017-12-27 ENCOUNTER — CARDPULM VISIT (OUTPATIENT)
Dept: CARDIAC REHAB | Facility: HOSPITAL | Age: 72
End: 2017-12-27
Attending: INTERNAL MEDICINE
Payer: MEDICARE

## 2017-12-27 PROCEDURE — 93798 PHYS/QHP OP CAR RHAB W/ECG: CPT

## 2017-12-29 ENCOUNTER — CARDPULM VISIT (OUTPATIENT)
Dept: CARDIAC REHAB | Facility: HOSPITAL | Age: 72
End: 2017-12-29
Attending: INTERNAL MEDICINE
Payer: MEDICARE

## 2017-12-29 PROCEDURE — 93798 PHYS/QHP OP CAR RHAB W/ECG: CPT

## 2018-01-01 ENCOUNTER — APPOINTMENT (OUTPATIENT)
Dept: CARDIAC REHAB | Facility: HOSPITAL | Age: 73
End: 2018-01-01
Attending: INTERNAL MEDICINE
Payer: MEDICARE

## 2018-01-03 ENCOUNTER — CARDPULM VISIT (OUTPATIENT)
Dept: CARDIAC REHAB | Facility: HOSPITAL | Age: 73
End: 2018-01-03
Attending: INTERNAL MEDICINE
Payer: MEDICARE

## 2018-01-03 PROCEDURE — 93798 PHYS/QHP OP CAR RHAB W/ECG: CPT

## 2018-01-05 ENCOUNTER — CARDPULM VISIT (OUTPATIENT)
Dept: CARDIAC REHAB | Facility: HOSPITAL | Age: 73
End: 2018-01-05
Attending: INTERNAL MEDICINE
Payer: MEDICARE

## 2018-01-05 PROCEDURE — 93798 PHYS/QHP OP CAR RHAB W/ECG: CPT

## 2018-01-08 ENCOUNTER — CARDPULM VISIT (OUTPATIENT)
Dept: CARDIAC REHAB | Facility: HOSPITAL | Age: 73
End: 2018-01-08
Attending: INTERNAL MEDICINE
Payer: MEDICARE

## 2018-01-08 PROCEDURE — 93798 PHYS/QHP OP CAR RHAB W/ECG: CPT

## 2018-01-10 ENCOUNTER — CARDPULM VISIT (OUTPATIENT)
Dept: CARDIAC REHAB | Facility: HOSPITAL | Age: 73
End: 2018-01-10
Attending: INTERNAL MEDICINE
Payer: MEDICARE

## 2018-01-10 PROCEDURE — 93798 PHYS/QHP OP CAR RHAB W/ECG: CPT

## 2018-01-12 ENCOUNTER — CARDPULM VISIT (OUTPATIENT)
Dept: CARDIAC REHAB | Facility: HOSPITAL | Age: 73
End: 2018-01-12
Attending: INTERNAL MEDICINE
Payer: MEDICARE

## 2018-01-12 PROCEDURE — 93798 PHYS/QHP OP CAR RHAB W/ECG: CPT

## 2018-01-15 ENCOUNTER — CARDPULM VISIT (OUTPATIENT)
Dept: CARDIAC REHAB | Facility: HOSPITAL | Age: 73
End: 2018-01-15
Attending: INTERNAL MEDICINE
Payer: MEDICARE

## 2018-01-15 PROCEDURE — 93798 PHYS/QHP OP CAR RHAB W/ECG: CPT

## 2018-01-17 ENCOUNTER — CARDPULM VISIT (OUTPATIENT)
Dept: CARDIAC REHAB | Facility: HOSPITAL | Age: 73
End: 2018-01-17
Attending: INTERNAL MEDICINE
Payer: MEDICARE

## 2018-01-17 PROCEDURE — 93798 PHYS/QHP OP CAR RHAB W/ECG: CPT

## 2018-01-19 ENCOUNTER — CARDPULM VISIT (OUTPATIENT)
Dept: CARDIAC REHAB | Facility: HOSPITAL | Age: 73
End: 2018-01-19
Attending: INTERNAL MEDICINE
Payer: MEDICARE

## 2018-01-19 PROCEDURE — 93798 PHYS/QHP OP CAR RHAB W/ECG: CPT

## 2018-01-22 ENCOUNTER — APPOINTMENT (OUTPATIENT)
Dept: CARDIAC REHAB | Facility: HOSPITAL | Age: 73
End: 2018-01-22
Attending: INTERNAL MEDICINE
Payer: MEDICARE

## 2018-01-24 ENCOUNTER — CARDPULM VISIT (OUTPATIENT)
Dept: CARDIAC REHAB | Facility: HOSPITAL | Age: 73
End: 2018-01-24
Attending: INTERNAL MEDICINE
Payer: MEDICARE

## 2018-01-24 PROCEDURE — 93798 PHYS/QHP OP CAR RHAB W/ECG: CPT

## 2018-01-26 ENCOUNTER — CARDPULM VISIT (OUTPATIENT)
Dept: CARDIAC REHAB | Facility: HOSPITAL | Age: 73
End: 2018-01-26
Attending: INTERNAL MEDICINE
Payer: MEDICARE

## 2018-01-26 PROCEDURE — 93798 PHYS/QHP OP CAR RHAB W/ECG: CPT

## 2018-01-29 ENCOUNTER — CARDPULM VISIT (OUTPATIENT)
Dept: CARDIAC REHAB | Facility: HOSPITAL | Age: 73
End: 2018-01-29
Attending: INTERNAL MEDICINE
Payer: MEDICARE

## 2018-01-29 PROCEDURE — 93798 PHYS/QHP OP CAR RHAB W/ECG: CPT

## 2018-01-31 ENCOUNTER — CARDPULM VISIT (OUTPATIENT)
Dept: CARDIAC REHAB | Facility: HOSPITAL | Age: 73
End: 2018-01-31
Attending: INTERNAL MEDICINE
Payer: MEDICARE

## 2018-01-31 PROCEDURE — 93798 PHYS/QHP OP CAR RHAB W/ECG: CPT

## 2018-02-02 ENCOUNTER — CARDPULM VISIT (OUTPATIENT)
Dept: CARDIAC REHAB | Facility: HOSPITAL | Age: 73
End: 2018-02-02
Attending: INTERNAL MEDICINE
Payer: MEDICARE

## 2018-02-02 PROCEDURE — 93798 PHYS/QHP OP CAR RHAB W/ECG: CPT

## 2018-02-05 ENCOUNTER — CARDPULM VISIT (OUTPATIENT)
Dept: CARDIAC REHAB | Facility: HOSPITAL | Age: 73
End: 2018-02-05
Attending: INTERNAL MEDICINE
Payer: MEDICARE

## 2018-02-05 PROCEDURE — 93798 PHYS/QHP OP CAR RHAB W/ECG: CPT

## 2018-02-07 ENCOUNTER — CARDPULM VISIT (OUTPATIENT)
Dept: CARDIAC REHAB | Facility: HOSPITAL | Age: 73
End: 2018-02-07
Attending: INTERNAL MEDICINE
Payer: MEDICARE

## 2018-02-07 PROCEDURE — 93798 PHYS/QHP OP CAR RHAB W/ECG: CPT

## 2018-02-09 ENCOUNTER — CARDPULM VISIT (OUTPATIENT)
Dept: CARDIAC REHAB | Facility: HOSPITAL | Age: 73
End: 2018-02-09
Attending: INTERNAL MEDICINE
Payer: MEDICARE

## 2018-02-09 PROCEDURE — 93798 PHYS/QHP OP CAR RHAB W/ECG: CPT

## 2018-02-12 ENCOUNTER — CARDPULM VISIT (OUTPATIENT)
Dept: CARDIAC REHAB | Facility: HOSPITAL | Age: 73
End: 2018-02-12
Attending: INTERNAL MEDICINE
Payer: MEDICARE

## 2018-02-12 PROCEDURE — 93798 PHYS/QHP OP CAR RHAB W/ECG: CPT

## 2018-02-13 ENCOUNTER — HOSPITAL ENCOUNTER (OUTPATIENT)
Dept: CARDIOLOGY CLINIC | Facility: HOSPITAL | Age: 73
Discharge: HOME | End: 2018-02-13
Attending: INTERNAL MEDICINE

## 2018-02-13 DIAGNOSIS — Z13.9 ENCOUNTER FOR SCREENING: ICD-10-CM

## 2018-02-14 ENCOUNTER — CARDPULM VISIT (OUTPATIENT)
Dept: CARDIAC REHAB | Facility: HOSPITAL | Age: 73
End: 2018-02-14
Attending: INTERNAL MEDICINE
Payer: MEDICARE

## 2018-02-14 PROCEDURE — 93798 PHYS/QHP OP CAR RHAB W/ECG: CPT

## 2018-02-16 ENCOUNTER — CARDPULM VISIT (OUTPATIENT)
Dept: CARDIAC REHAB | Facility: HOSPITAL | Age: 73
End: 2018-02-16
Attending: INTERNAL MEDICINE
Payer: MEDICARE

## 2018-02-16 PROCEDURE — 93798 PHYS/QHP OP CAR RHAB W/ECG: CPT

## 2018-02-19 ENCOUNTER — CARDPULM VISIT (OUTPATIENT)
Dept: CARDIAC REHAB | Facility: HOSPITAL | Age: 73
End: 2018-02-19
Attending: INTERNAL MEDICINE
Payer: MEDICARE

## 2018-02-19 PROCEDURE — 93798 PHYS/QHP OP CAR RHAB W/ECG: CPT

## 2018-02-21 ENCOUNTER — CARDPULM VISIT (OUTPATIENT)
Dept: CARDIAC REHAB | Facility: HOSPITAL | Age: 73
End: 2018-02-21
Attending: INTERNAL MEDICINE
Payer: MEDICARE

## 2018-02-21 PROCEDURE — 93798 PHYS/QHP OP CAR RHAB W/ECG: CPT

## 2018-02-23 ENCOUNTER — CARDPULM VISIT (OUTPATIENT)
Dept: CARDIAC REHAB | Facility: HOSPITAL | Age: 73
End: 2018-02-23
Attending: INTERNAL MEDICINE
Payer: MEDICARE

## 2018-02-23 PROCEDURE — 93798 PHYS/QHP OP CAR RHAB W/ECG: CPT

## 2018-02-26 ENCOUNTER — CARDPULM VISIT (OUTPATIENT)
Dept: CARDIAC REHAB | Facility: HOSPITAL | Age: 73
End: 2018-02-26
Attending: INTERNAL MEDICINE
Payer: MEDICARE

## 2018-02-26 PROCEDURE — 93798 PHYS/QHP OP CAR RHAB W/ECG: CPT

## 2018-02-28 ENCOUNTER — CARDPULM VISIT (OUTPATIENT)
Dept: CARDIAC REHAB | Facility: HOSPITAL | Age: 73
End: 2018-02-28
Attending: INTERNAL MEDICINE
Payer: MEDICARE

## 2018-02-28 PROCEDURE — 93798 PHYS/QHP OP CAR RHAB W/ECG: CPT

## 2018-03-02 ENCOUNTER — APPOINTMENT (OUTPATIENT)
Dept: CARDIAC REHAB | Facility: HOSPITAL | Age: 73
End: 2018-03-02
Attending: INTERNAL MEDICINE
Payer: MEDICARE

## 2018-03-05 ENCOUNTER — CARDPULM VISIT (OUTPATIENT)
Dept: CARDIAC REHAB | Facility: HOSPITAL | Age: 73
End: 2018-03-05
Attending: INTERNAL MEDICINE
Payer: MEDICARE

## 2018-03-05 PROCEDURE — 93798 PHYS/QHP OP CAR RHAB W/ECG: CPT

## 2018-03-07 ENCOUNTER — CARDPULM VISIT (OUTPATIENT)
Dept: CARDIAC REHAB | Facility: HOSPITAL | Age: 73
End: 2018-03-07
Attending: INTERNAL MEDICINE
Payer: MEDICARE

## 2018-03-07 PROCEDURE — 93798 PHYS/QHP OP CAR RHAB W/ECG: CPT

## 2018-03-09 ENCOUNTER — CARDPULM VISIT (OUTPATIENT)
Dept: CARDIAC REHAB | Facility: HOSPITAL | Age: 73
End: 2018-03-09
Attending: INTERNAL MEDICINE
Payer: MEDICARE

## 2018-03-09 PROCEDURE — 93798 PHYS/QHP OP CAR RHAB W/ECG: CPT

## 2018-03-12 ENCOUNTER — CARDPULM VISIT (OUTPATIENT)
Dept: CARDIAC REHAB | Facility: HOSPITAL | Age: 73
End: 2018-03-12
Attending: INTERNAL MEDICINE
Payer: MEDICARE

## 2018-03-12 PROCEDURE — 93798 PHYS/QHP OP CAR RHAB W/ECG: CPT

## 2018-03-14 ENCOUNTER — CARDPULM VISIT (OUTPATIENT)
Dept: CARDIAC REHAB | Facility: HOSPITAL | Age: 73
End: 2018-03-14
Attending: INTERNAL MEDICINE
Payer: MEDICARE

## 2018-03-14 PROCEDURE — 93798 PHYS/QHP OP CAR RHAB W/ECG: CPT

## 2019-04-26 ENCOUNTER — HOSPITAL ENCOUNTER (OUTPATIENT)
Facility: HOSPITAL | Age: 74
Setting detail: HOSPITAL OUTPATIENT SURGERY
Discharge: HOME | End: 2019-04-26
Attending: INTERNAL MEDICINE | Admitting: INTERNAL MEDICINE
Payer: MEDICARE

## 2019-04-26 VITALS
HEIGHT: 68 IN | WEIGHT: 180 LBS | BODY MASS INDEX: 27.28 KG/M2 | SYSTOLIC BLOOD PRESSURE: 135 MMHG | OXYGEN SATURATION: 95 % | DIASTOLIC BLOOD PRESSURE: 65 MMHG | HEART RATE: 62 BPM | RESPIRATION RATE: 16 BRPM

## 2019-04-26 DIAGNOSIS — Z80.0 FAMILY HISTORY OF COLON CANCER: ICD-10-CM

## 2019-04-26 DIAGNOSIS — Z86.010 PERSONAL HISTORY OF COLONIC POLYPS: ICD-10-CM

## 2019-04-26 PROCEDURE — 99152 MOD SED SAME PHYS/QHP 5/>YRS: CPT | Performed by: INTERNAL MEDICINE

## 2019-04-26 PROCEDURE — 88305 TISSUE EXAM BY PATHOLOGIST: CPT | Performed by: INTERNAL MEDICINE

## 2019-04-26 PROCEDURE — 82962 GLUCOSE BLOOD TEST: CPT

## 2019-04-26 PROCEDURE — 99153 MOD SED SAME PHYS/QHP EA: CPT | Performed by: INTERNAL MEDICINE

## 2019-04-26 PROCEDURE — 0DBH8ZX EXCISION OF CECUM, VIA NATURAL OR ARTIFICIAL OPENING ENDOSCOPIC, DIAGNOSTIC: ICD-10-PCS | Performed by: INTERNAL MEDICINE

## 2019-04-26 RX ORDER — SODIUM CHLORIDE, SODIUM LACTATE, POTASSIUM CHLORIDE, CALCIUM CHLORIDE 600; 310; 30; 20 MG/100ML; MG/100ML; MG/100ML; MG/100ML
INJECTION, SOLUTION INTRAVENOUS CONTINUOUS
Status: DISCONTINUED | OUTPATIENT
Start: 2019-04-26 | End: 2019-04-26

## 2019-04-26 RX ORDER — MIDAZOLAM HYDROCHLORIDE 1 MG/ML
INJECTION INTRAMUSCULAR; INTRAVENOUS
Status: DISCONTINUED | OUTPATIENT
Start: 2019-04-26 | End: 2019-04-26

## 2019-04-26 NOTE — H&P
GI H and P    Alanis Aamir presents for follow up for evaluation and management of  family hx of colon cancer and personal history of colon polyps. Last colonoscopy 2006, 2009, 2012 and 2015 - no polyps.   Pt has been undergoing colonoscopies every 3 y internal hemorrhoids were noted.  The scope withdrawal from cecum to anus was 10 minutes.     RECOMMENDATIONS         1. The patient will be provided with a written summary of today's endoscopic findings.       4.  Follow up colonoscopy in 3 years.      Ad Lisinopril 40 MG Oral Tab 1/2 TABLET TWICE DAILY Disp:  Rfl:    PRECISION XTRA BLOOD GLUCOSE VI STRP CHECK BLOODSUGAR THREE TIMES DAILY BEFORE EACH MEAL Disp: 100 Strip Rfl: 3   NOVOLOG 100 UNIT/ML SC SOLN 15 breakfast 15lunch 20 dinner Disp:  Rfl:    AS no fevers or chills, no weight loss    SKIN: denies any unusual skin lesions  HEENT: denies jaundice    LUNGS: denies shortness of breath   CV: denies chest pain  GI: denies dysphagia, nausea,vomitting  : denies hematuria    MSK: denies joint pain  NEURO DIAGNOSIS                family hx of colon cancer (both parents in 46s)               personal history of colon polyps   POSTOPERATIVE DIAGNOSIS:  1. Diverticulosis of the left colon.   2. Enlarged internal hemorrhoids.     PROCEDURE PERFORMED:             daily. Per [place] Disp: 90 tablet Rfl: 3    aspirin 81 MG Oral Tab Take 81 mg by mouth daily. Disp:  Rfl:     Vitamin D3 2000 units Oral Cap Take 2,000 Units by mouth daily. Disp:  Rfl:     AmLODIPine Besylate 5 MG Oral Tab Take 10 mg by mouth daily. [facility] ENDOSCOPY    • EYE BLEPHAROPLASTY Bilateral 6/1/2017      Performed by Joy Dior MD at [address] N/A 5/13/2015      Performed by Ganesh Srivastava MD at [facility]    • OTHER SURGICAL HISTORY   2008      ercp x 3 prandial diarrhea since GB removed. Plan:  1. May use prn imodium. If ineffective, will try cholestyramine. 2. Colonoscopy will be scheduled within the next month or at the patient's earliest convenience.   The risks of perforation, bleeding, and missed

## 2019-04-26 NOTE — BRIEF OP NOTE
Pre-Operative Diagnosis: Personal history of colonic polyps [Z86.010]  Family history of colon cancer [Z80.0]     Post-Operative Diagnosis: Colon polyp      Procedure Performed:   Procedure(s):  colonoscopy with polypectomy    Surgeon(s) and Role:     * Kl

## 2019-04-26 NOTE — OPERATIVE REPORT
PATIENT NAME: Emelyn Taylor  MRN: [id number]  DATE OF OPERATION: 4/26/2019  REFERRING PHYSICIAN: Dr. Cheli Sneed  Medications:  Versed 4 mg IVP              Fentanyl 100 mcg IVP  DATE OF LAST COLONOSCOPY:  2015  PREOPERATIVE DIAGNOSIS:               family hx of during the entire length of moderate sedation time. Total moderate sedation time was 24 minutes. RECOMMENDATIONS         1. The patient will be provided with a written summary of today's endoscopic findings.         2. The patient will be notified with

## 2019-05-15 PROCEDURE — 81003 URINALYSIS AUTO W/O SCOPE: CPT | Performed by: UROLOGY

## 2019-06-23 ENCOUNTER — APPOINTMENT (OUTPATIENT)
Dept: GENERAL RADIOLOGY | Facility: HOSPITAL | Age: 74
End: 2019-06-23
Attending: EMERGENCY MEDICINE
Payer: MEDICARE

## 2019-06-23 ENCOUNTER — HOSPITAL ENCOUNTER (OUTPATIENT)
Facility: HOSPITAL | Age: 74
Setting detail: OBSERVATION
Discharge: HOME | End: 2019-06-25
Attending: EMERGENCY MEDICINE | Admitting: HOSPITALIST
Payer: MEDICARE

## 2019-06-23 ENCOUNTER — APPOINTMENT (OUTPATIENT)
Dept: GENERAL RADIOLOGY | Facility: HOSPITAL | Age: 74
End: 2019-06-23
Attending: HOSPITALIST
Payer: MEDICARE

## 2019-06-23 DIAGNOSIS — E11.40 TYPE 2 DIABETES MELLITUS WITH DIABETIC NEUROPATHY, WITH LONG-TERM CURRENT USE OF INSULIN (HCC): ICD-10-CM

## 2019-06-23 DIAGNOSIS — I10 HYPERTENSION, UNSPECIFIED TYPE: ICD-10-CM

## 2019-06-23 DIAGNOSIS — Z79.4 TYPE 2 DIABETES MELLITUS WITH DIABETIC NEUROPATHY, WITH LONG-TERM CURRENT USE OF INSULIN (HCC): ICD-10-CM

## 2019-06-23 DIAGNOSIS — R07.9 ACUTE CHEST PAIN: Primary | ICD-10-CM

## 2019-06-23 DIAGNOSIS — I25.10 ATHEROSCLEROSIS OF NATIVE CORONARY ARTERY OF NATIVE HEART WITHOUT ANGINA PECTORIS: ICD-10-CM

## 2019-06-23 LAB
ALBUMIN SERPL-MCNC: 3.3 G/DL (ref 3.4–5)
ALBUMIN/GLOB SERPL: 0.9 {RATIO} (ref 1–2)
ALP LIVER SERPL-CCNC: 73 U/L (ref 45–117)
ALT SERPL-CCNC: 49 U/L (ref 16–61)
ANION GAP SERPL CALC-SCNC: 8 MMOL/L (ref 0–18)
APTT PPP: 107.4 SECONDS (ref 25.4–36.1)
APTT PPP: 36.4 SECONDS (ref 25.4–36.1)
APTT PPP: 51.5 SECONDS (ref 25.4–36.1)
AST SERPL-CCNC: 31 U/L (ref 15–37)
BASOPHILS # BLD AUTO: 0.05 X10(3) UL (ref 0–0.2)
BASOPHILS NFR BLD AUTO: 0.6 %
BILIRUB SERPL-MCNC: 0.2 MG/DL (ref 0.1–2)
BUN BLD-MCNC: 15 MG/DL (ref 7–18)
BUN/CREAT SERPL: 14.4 (ref 10–20)
CALCIUM BLD-MCNC: 8.8 MG/DL (ref 8.5–10.1)
CHLORIDE SERPL-SCNC: 108 MMOL/L (ref 98–112)
CK SERPL-CCNC: 137 U/L (ref 39–308)
CK SERPL-CCNC: 167 U/L (ref 39–308)
CO2 SERPL-SCNC: 21 MMOL/L (ref 21–32)
CREAT BLD-MCNC: 1.04 MG/DL (ref 0.7–1.3)
DEPRECATED RDW RBC AUTO: 43.8 FL (ref 35.1–46.3)
EOSINOPHIL # BLD AUTO: 0.18 X10(3) UL (ref 0–0.7)
EOSINOPHIL NFR BLD AUTO: 2.2 %
ERYTHROCYTE [DISTWIDTH] IN BLOOD BY AUTOMATED COUNT: 13.4 % (ref 11–15)
EST. AVERAGE GLUCOSE BLD GHB EST-MCNC: 189 MG/DL (ref 68–126)
GLOBULIN PLAS-MCNC: 3.5 G/DL (ref 2.8–4.4)
GLUCOSE BLD-MCNC: 139 MG/DL (ref 70–99)
GLUCOSE BLD-MCNC: 193 MG/DL (ref 70–99)
GLUCOSE BLD-MCNC: 212 MG/DL (ref 70–99)
GLUCOSE BLD-MCNC: 226 MG/DL (ref 70–99)
HBA1C MFR BLD HPLC: 8.2 % (ref ?–5.7)
HCT VFR BLD AUTO: 44.3 % (ref 39–53)
HGB BLD-MCNC: 15 G/DL (ref 13–17.5)
IMM GRANULOCYTES # BLD AUTO: 0.04 X10(3) UL (ref 0–1)
IMM GRANULOCYTES NFR BLD: 0.5 %
LYMPHOCYTES # BLD AUTO: 1.41 X10(3) UL (ref 1–4)
LYMPHOCYTES NFR BLD AUTO: 17.3 %
M PROTEIN MFR SERPL ELPH: 6.8 G/DL (ref 6.4–8.2)
MCH RBC QN AUTO: 29.9 PG (ref 26–34)
MCHC RBC AUTO-ENTMCNC: 33.9 G/DL (ref 31–37)
MCV RBC AUTO: 88.4 FL (ref 80–100)
MONOCYTES # BLD AUTO: 0.94 X10(3) UL (ref 0.1–1)
MONOCYTES NFR BLD AUTO: 11.5 %
NEUTROPHILS # BLD AUTO: 5.52 X10 (3) UL (ref 1.5–7.7)
NEUTROPHILS # BLD AUTO: 5.52 X10(3) UL (ref 1.5–7.7)
NEUTROPHILS NFR BLD AUTO: 67.9 %
OSMOLALITY SERPL CALC.SUM OF ELEC: 291 MOSM/KG (ref 275–295)
PLATELET # BLD AUTO: 216 10(3)UL (ref 150–450)
POTASSIUM SERPL-SCNC: 4.3 MMOL/L (ref 3.5–5.1)
RBC # BLD AUTO: 5.01 X10(6)UL (ref 3.8–5.8)
SODIUM SERPL-SCNC: 137 MMOL/L (ref 136–145)
TROPONIN I SERPL-MCNC: <0.045 NG/ML (ref ?–0.04)
WBC # BLD AUTO: 8.1 X10(3) UL (ref 4–11)

## 2019-06-23 PROCEDURE — 93010 ELECTROCARDIOGRAM REPORT: CPT | Performed by: INTERNAL MEDICINE

## 2019-06-23 PROCEDURE — 84484 ASSAY OF TROPONIN QUANT: CPT | Performed by: EMERGENCY MEDICINE

## 2019-06-23 PROCEDURE — 96365 THER/PROPH/DIAG IV INF INIT: CPT

## 2019-06-23 PROCEDURE — 85730 THROMBOPLASTIN TIME PARTIAL: CPT | Performed by: HOSPITALIST

## 2019-06-23 PROCEDURE — 82962 GLUCOSE BLOOD TEST: CPT

## 2019-06-23 PROCEDURE — 84484 ASSAY OF TROPONIN QUANT: CPT | Performed by: HOSPITALIST

## 2019-06-23 PROCEDURE — 85025 COMPLETE CBC W/AUTO DIFF WBC: CPT | Performed by: EMERGENCY MEDICINE

## 2019-06-23 PROCEDURE — 93005 ELECTROCARDIOGRAM TRACING: CPT

## 2019-06-23 PROCEDURE — 71045 X-RAY EXAM CHEST 1 VIEW: CPT | Performed by: HOSPITALIST

## 2019-06-23 PROCEDURE — C9113 INJ PANTOPRAZOLE SODIUM, VIA: HCPCS | Performed by: HOSPITALIST

## 2019-06-23 PROCEDURE — 99285 EMERGENCY DEPT VISIT HI MDM: CPT

## 2019-06-23 PROCEDURE — 80053 COMPREHEN METABOLIC PANEL: CPT | Performed by: EMERGENCY MEDICINE

## 2019-06-23 PROCEDURE — 84484 ASSAY OF TROPONIN QUANT: CPT | Performed by: INTERNAL MEDICINE

## 2019-06-23 PROCEDURE — 83036 HEMOGLOBIN GLYCOSYLATED A1C: CPT | Performed by: HOSPITALIST

## 2019-06-23 PROCEDURE — 82550 ASSAY OF CK (CPK): CPT | Performed by: INTERNAL MEDICINE

## 2019-06-23 PROCEDURE — 85730 THROMBOPLASTIN TIME PARTIAL: CPT | Performed by: EMERGENCY MEDICINE

## 2019-06-23 PROCEDURE — 85730 THROMBOPLASTIN TIME PARTIAL: CPT | Performed by: INTERNAL MEDICINE

## 2019-06-23 PROCEDURE — 93010 ELECTROCARDIOGRAM REPORT: CPT

## 2019-06-23 PROCEDURE — 96374 THER/PROPH/DIAG INJ IV PUSH: CPT

## 2019-06-23 RX ORDER — ASPIRIN 81 MG/1
81 TABLET ORAL DAILY
Status: DISCONTINUED | OUTPATIENT
Start: 2019-06-23 | End: 2019-06-25

## 2019-06-23 RX ORDER — MORPHINE SULFATE 4 MG/ML
1 INJECTION, SOLUTION INTRAMUSCULAR; INTRAVENOUS EVERY 2 HOUR PRN
Status: DISCONTINUED | OUTPATIENT
Start: 2019-06-23 | End: 2019-06-25

## 2019-06-23 RX ORDER — HEPARIN SODIUM AND DEXTROSE 10000; 5 [USP'U]/100ML; G/100ML
INJECTION INTRAVENOUS CONTINUOUS
Status: DISCONTINUED | OUTPATIENT
Start: 2019-06-23 | End: 2019-06-24

## 2019-06-23 RX ORDER — HEPARIN SODIUM 5000 [USP'U]/ML
60 INJECTION INTRAVENOUS; SUBCUTANEOUS ONCE
Status: COMPLETED | OUTPATIENT
Start: 2019-06-23 | End: 2019-06-23

## 2019-06-23 RX ORDER — MAGNESIUM OXIDE 400 MG (241.3 MG MAGNESIUM) TABLET
1 TABLET NIGHTLY PRN
Status: DISCONTINUED | OUTPATIENT
Start: 2019-06-23 | End: 2019-06-25

## 2019-06-23 RX ORDER — ATORVASTATIN CALCIUM 10 MG/1
10 TABLET, FILM COATED ORAL NIGHTLY
Status: DISCONTINUED | OUTPATIENT
Start: 2019-06-23 | End: 2019-06-25

## 2019-06-23 RX ORDER — DEXTROSE MONOHYDRATE 25 G/50ML
50 INJECTION, SOLUTION INTRAVENOUS
Status: DISCONTINUED | OUTPATIENT
Start: 2019-06-23 | End: 2019-06-25

## 2019-06-23 RX ORDER — ASPIRIN 81 MG/1
324 TABLET, CHEWABLE ORAL ONCE
Status: DISCONTINUED | OUTPATIENT
Start: 2019-06-23 | End: 2019-06-23

## 2019-06-23 RX ORDER — CLOPIDOGREL BISULFATE 75 MG/1
75 TABLET ORAL DAILY
Status: DISCONTINUED | OUTPATIENT
Start: 2019-06-23 | End: 2019-06-24

## 2019-06-23 RX ORDER — HEPARIN SODIUM AND DEXTROSE 10000; 5 [USP'U]/100ML; G/100ML
12 INJECTION INTRAVENOUS ONCE
Status: DISCONTINUED | OUTPATIENT
Start: 2019-06-23 | End: 2019-06-24

## 2019-06-23 RX ORDER — ONDANSETRON 2 MG/ML
4 INJECTION INTRAMUSCULAR; INTRAVENOUS EVERY 6 HOURS PRN
Status: DISCONTINUED | OUTPATIENT
Start: 2019-06-23 | End: 2019-06-25

## 2019-06-23 RX ORDER — ASPIRIN 81 MG/1
81 TABLET, CHEWABLE ORAL ONCE
Status: COMPLETED | OUTPATIENT
Start: 2019-06-23 | End: 2019-06-23

## 2019-06-23 RX ORDER — METOPROLOL SUCCINATE 25 MG/1
25 TABLET, EXTENDED RELEASE ORAL
Status: DISCONTINUED | OUTPATIENT
Start: 2019-06-23 | End: 2019-06-25

## 2019-06-23 RX ORDER — NITROGLYCERIN 0.4 MG/1
0.4 TABLET SUBLINGUAL ONCE
Status: COMPLETED | OUTPATIENT
Start: 2019-06-23 | End: 2019-06-23

## 2019-06-23 RX ORDER — HEPARIN SODIUM AND DEXTROSE 10000; 5 [USP'U]/100ML; G/100ML
12 INJECTION INTRAVENOUS ONCE
Status: DISCONTINUED | OUTPATIENT
Start: 2019-06-23 | End: 2019-06-23

## 2019-06-23 RX ORDER — MORPHINE SULFATE 4 MG/ML
0.5 INJECTION, SOLUTION INTRAMUSCULAR; INTRAVENOUS EVERY 2 HOUR PRN
Status: DISCONTINUED | OUTPATIENT
Start: 2019-06-23 | End: 2019-06-25

## 2019-06-23 NOTE — PLAN OF CARE
Patient admitted from ED around 1030. Pt a/o x 4, RA, SB on tele monitor in the 46s. C/o intermittent CP. EKG completed this a.m., no changes. Cards and hospitalist aware. Nitro paste to left upper chest. Heparin gtt ACS protocol.  Plan for angiogram tomorr

## 2019-06-23 NOTE — ED PROVIDER NOTES
Patient Seen in: [facility] Emergency Department    History   Patient presents with:  Chest Pain Angina (cardiovascular)    Stated Complaint: chest pain     HPI    26-year-old male with a history of coronary artery disease status post 5 vessel CABG diverticulosis   • COLONOSCOPY  7/23/15    diverticulosis and enlarged internal hemorrhoids   • COLONOSCOPY N/A 4/26/2019    Performed by Asa Duncan MD at [facility] ENDOSCOPY   • COLONOSCOPY N/A 7/23/2015    Performed by Asa Duncan MD at [facility] ENDOSCOPY exam: Awake, conversive and moving all 4 extremities well.     ED Course     Labs Reviewed   COMP METABOLIC PANEL (14) - Abnormal; Notable for the following components:       Result Value    Glucose 212 (*)     Albumin 3.3 (*)     A/G Ratio 0.9 (*)     All disposition. MDM   Chest pain in a patient with known coronary artery disease.     Admission disposition: 6/23/2019  8:44 AM                 Disposition and Plan     Clinical Impression:  Acute chest pain  (primary encounter diagnosis)  Atheroscleros

## 2019-06-23 NOTE — CONSULTS
[facility] Cardiology  Report of Consultation    Florian Barone Patient Status:  Observation    1945 MRN [id number]   [facility] 2NE-A Attending Horace Grijalva MD   Hosp Day # 0 PCP Mary Garcia MD     Reason for [facility] • pantoprazole (PROTONIX) IV push  40 mg Intravenous Q24H       Continuous Infusion:   • Continuous dose Heparin infusion         PRN Medications:   ondansetron HCl, morphINE sulfate **OR** morphINE sulfate, glucose **OR** Glucose-Vitamin C **OR** dextro no acute distress. HEENT:   Normocephalic. Atraumatic. Eyes with no scleral icterus. Neck: Supple. No JVD. Carotids 2+ and equal in symmetric fashion. No bruits are noted. Cardiac: Regular rate and rhythm. There is a normal S1 and S2.   No S3 or S

## 2019-06-23 NOTE — H&P
[facility] Hospitalist H&P       CC: Patient presents with:  Chest Pain Angina (cardiovascular)       PCP: Rina Lyn MD    History of Present Illness:  Patient is a 75year old male with PMH sig for extensive cardiac hx including CABG, multiple PCl as well a (3/09), diverticulosis, int hem   • COLONOSCOPY  4/2012    diverticulosis   • COLONOSCOPY  7/23/15    diverticulosis and enlarged internal hemorrhoids   • COLONOSCOPY N/A 4/26/2019    Performed by Rupali Carpio MD at [facility] ENDOSCOPY   • COLONOSCOPY N/A 7/2 and HS     Continuous Infusing Medication:  • Continuous dose Heparin infusion       PRN Medication:ondansetron HCl, morphINE sulfate **OR** morphINE sulfate, glucose **OR** Glucose-Vitamin C **OR** dextrose **OR** glucose **OR** Glucose-Vitamin C       So Check trop.   - CXR ordered  - hep gtt  - IV morphine PRN  - apprec cardiology consultation  - NPO for now in case of cath  - IV PPI    # CAD s/p CABG, PCl, HTN, HL  - pt reports taking all of his home cardiac medications prior to admission including asa x

## 2019-06-23 NOTE — ED NOTES
Report given to Kemi Coreas rn at this time, rn is aware of current heparin infusion and pt remains stable upon transport

## 2019-06-24 ENCOUNTER — APPOINTMENT (OUTPATIENT)
Dept: INTERVENTIONAL RADIOLOGY/VASCULAR | Facility: HOSPITAL | Age: 74
End: 2019-06-24
Attending: INTERNAL MEDICINE
Payer: MEDICARE

## 2019-06-24 LAB
ANION GAP SERPL CALC-SCNC: 5 MMOL/L (ref 0–18)
APTT PPP: 55.7 SECONDS (ref 25.4–36.1)
ATRIAL RATE: 55 BPM
ATRIAL RATE: 58 BPM
BASOPHILS # BLD AUTO: 0.05 X10(3) UL (ref 0–0.2)
BASOPHILS NFR BLD AUTO: 0.5 %
BUN BLD-MCNC: 16 MG/DL (ref 7–18)
BUN/CREAT SERPL: 16 (ref 10–20)
CALCIUM BLD-MCNC: 8.7 MG/DL (ref 8.5–10.1)
CHLORIDE SERPL-SCNC: 110 MMOL/L (ref 98–112)
CHOLEST SMN-MCNC: 104 MG/DL (ref ?–200)
CK SERPL-CCNC: 105 U/L (ref 39–308)
CO2 SERPL-SCNC: 23 MMOL/L (ref 21–32)
CREAT BLD-MCNC: 1 MG/DL (ref 0.7–1.3)
DEPRECATED RDW RBC AUTO: 44.3 FL (ref 35.1–46.3)
EOSINOPHIL # BLD AUTO: 0.25 X10(3) UL (ref 0–0.7)
EOSINOPHIL NFR BLD AUTO: 2.6 %
ERYTHROCYTE [DISTWIDTH] IN BLOOD BY AUTOMATED COUNT: 13.7 % (ref 11–15)
GLUCOSE BLD-MCNC: 114 MG/DL (ref 70–99)
GLUCOSE BLD-MCNC: 118 MG/DL (ref 70–99)
GLUCOSE BLD-MCNC: 134 MG/DL (ref 70–99)
GLUCOSE BLD-MCNC: 154 MG/DL (ref 70–99)
GLUCOSE BLD-MCNC: 170 MG/DL (ref 70–99)
GLUCOSE BLD-MCNC: 207 MG/DL (ref 70–99)
GLUCOSE BLD-MCNC: 300 MG/DL (ref 70–99)
HCT VFR BLD AUTO: 45 % (ref 39–53)
HDLC SERPL-MCNC: 46 MG/DL (ref 40–59)
HGB BLD-MCNC: 15.1 G/DL (ref 13–17.5)
IMM GRANULOCYTES # BLD AUTO: 0.03 X10(3) UL (ref 0–1)
IMM GRANULOCYTES NFR BLD: 0.3 %
LDLC SERPL CALC-MCNC: 38 MG/DL (ref ?–100)
LYMPHOCYTES # BLD AUTO: 2.09 X10(3) UL (ref 1–4)
LYMPHOCYTES NFR BLD AUTO: 22 %
MCH RBC QN AUTO: 29.8 PG (ref 26–34)
MCHC RBC AUTO-ENTMCNC: 33.6 G/DL (ref 31–37)
MCV RBC AUTO: 88.8 FL (ref 80–100)
MONOCYTES # BLD AUTO: 1.13 X10(3) UL (ref 0.1–1)
MONOCYTES NFR BLD AUTO: 11.9 %
NEUTROPHILS # BLD AUTO: 5.96 X10 (3) UL (ref 1.5–7.7)
NEUTROPHILS # BLD AUTO: 5.96 X10(3) UL (ref 1.5–7.7)
NEUTROPHILS NFR BLD AUTO: 62.7 %
NONHDLC SERPL-MCNC: 58 MG/DL (ref ?–130)
OSMOLALITY SERPL CALC.SUM OF ELEC: 290 MOSM/KG (ref 275–295)
P AXIS: 47 DEGREES
P AXIS: 52 DEGREES
P-R INTERVAL: 262 MS
P-R INTERVAL: 306 MS
PLATELET # BLD AUTO: 205 10(3)UL (ref 150–450)
POTASSIUM SERPL-SCNC: 4.5 MMOL/L (ref 3.5–5.1)
Q-T INTERVAL: 424 MS
Q-T INTERVAL: 442 MS
QRS DURATION: 122 MS
QRS DURATION: 128 MS
QTC CALCULATION (BEZET): 416 MS
QTC CALCULATION (BEZET): 422 MS
R AXIS: -14 DEGREES
R AXIS: -14 DEGREES
RBC # BLD AUTO: 5.07 X10(6)UL (ref 3.8–5.8)
SODIUM SERPL-SCNC: 138 MMOL/L (ref 136–145)
T AXIS: 26 DEGREES
T AXIS: 33 DEGREES
TRIGL SERPL-MCNC: 99 MG/DL (ref 30–149)
TROPONIN I SERPL-MCNC: <0.045 NG/ML (ref ?–0.04)
VENTRICULAR RATE: 55 BPM
VENTRICULAR RATE: 58 BPM
VLDLC SERPL CALC-MCNC: 20 MG/DL (ref 0–30)
WBC # BLD AUTO: 9.5 X10(3) UL (ref 4–11)

## 2019-06-24 PROCEDURE — 82962 GLUCOSE BLOOD TEST: CPT

## 2019-06-24 PROCEDURE — 85025 COMPLETE CBC W/AUTO DIFF WBC: CPT | Performed by: HOSPITALIST

## 2019-06-24 PROCEDURE — 93459 L HRT ART/GRFT ANGIO: CPT

## 2019-06-24 PROCEDURE — B2181ZZ FLUOROSCOPY OF LEFT INTERNAL MAMMARY BYPASS GRAFT USING LOW OSMOLAR CONTRAST: ICD-10-PCS | Performed by: INTERNAL MEDICINE

## 2019-06-24 PROCEDURE — 4A023N8 MEASUREMENT OF CARDIAC SAMPLING AND PRESSURE, BILATERAL, PERCUTANEOUS APPROACH: ICD-10-PCS | Performed by: INTERNAL MEDICINE

## 2019-06-24 PROCEDURE — B2151ZZ FLUOROSCOPY OF LEFT HEART USING LOW OSMOLAR CONTRAST: ICD-10-PCS | Performed by: INTERNAL MEDICINE

## 2019-06-24 PROCEDURE — B2111ZZ FLUOROSCOPY OF MULTIPLE CORONARY ARTERIES USING LOW OSMOLAR CONTRAST: ICD-10-PCS | Performed by: INTERNAL MEDICINE

## 2019-06-24 PROCEDURE — 99152 MOD SED SAME PHYS/QHP 5/>YRS: CPT

## 2019-06-24 PROCEDURE — 85730 THROMBOPLASTIN TIME PARTIAL: CPT | Performed by: HOSPITALIST

## 2019-06-24 PROCEDURE — B2121ZZ FLUOROSCOPY OF SINGLE CORONARY ARTERY BYPASS GRAFT USING LOW OSMOLAR CONTRAST: ICD-10-PCS | Performed by: INTERNAL MEDICINE

## 2019-06-24 PROCEDURE — 80061 LIPID PANEL: CPT | Performed by: INTERNAL MEDICINE

## 2019-06-24 PROCEDURE — 99153 MOD SED SAME PHYS/QHP EA: CPT

## 2019-06-24 PROCEDURE — 80048 BASIC METABOLIC PNL TOTAL CA: CPT | Performed by: HOSPITALIST

## 2019-06-24 RX ORDER — SODIUM CHLORIDE 9 MG/ML
INJECTION, SOLUTION INTRAVENOUS CONTINUOUS
Status: ACTIVE | OUTPATIENT
Start: 2019-06-24 | End: 2019-06-24

## 2019-06-24 RX ORDER — BIVALIRUDIN 250 MG/5ML
INJECTION, POWDER, LYOPHILIZED, FOR SOLUTION INTRAVENOUS
Status: COMPLETED
Start: 2019-06-24 | End: 2019-06-24

## 2019-06-24 RX ORDER — HEPARIN SODIUM 5000 [USP'U]/ML
INJECTION, SOLUTION INTRAVENOUS; SUBCUTANEOUS
Status: COMPLETED
Start: 2019-06-24 | End: 2019-06-24

## 2019-06-24 RX ORDER — CLOPIDOGREL BISULFATE 75 MG/1
75 TABLET ORAL DAILY
Status: DISCONTINUED | OUTPATIENT
Start: 2019-06-25 | End: 2019-06-25

## 2019-06-24 RX ORDER — LIDOCAINE HYDROCHLORIDE 10 MG/ML
INJECTION, SOLUTION EPIDURAL; INFILTRATION; INTRACAUDAL; PERINEURAL
Status: COMPLETED
Start: 2019-06-24 | End: 2019-06-24

## 2019-06-24 RX ORDER — MIDAZOLAM HYDROCHLORIDE 1 MG/ML
INJECTION INTRAMUSCULAR; INTRAVENOUS
Status: COMPLETED
Start: 2019-06-24 | End: 2019-06-24

## 2019-06-24 RX ORDER — SODIUM CHLORIDE 9 MG/ML
INJECTION, SOLUTION INTRAVENOUS
Status: COMPLETED | OUTPATIENT
Start: 2019-06-24 | End: 2019-06-24

## 2019-06-24 RX ORDER — ASPIRIN 81 MG/1
81 TABLET ORAL DAILY
Status: DISCONTINUED | OUTPATIENT
Start: 2019-06-25 | End: 2019-06-25

## 2019-06-24 RX ORDER — PANTOPRAZOLE SODIUM 40 MG/1
40 TABLET, DELAYED RELEASE ORAL
Status: DISCONTINUED | OUTPATIENT
Start: 2019-06-25 | End: 2019-06-25

## 2019-06-24 NOTE — PLAN OF CARE
Pt received at 0730. Alert and oriented x4. Saturating well on RA. SB on tele. PIV patent and infusing with heparin per protocol. Stopped prior to cath. NPO this am. Denies pain or discomfort. 1030- Pt received from cath lab.  R groin incision site soft,

## 2019-06-24 NOTE — PROCEDURES
[facility] Cardiology      Procedure:  LHC, CORONARY ANGIO, SVG/LIMA ANGIO LV ANGIO, CRISTÓBAL SVG--> DIAG                         PERCLOSE RFA      Findings    LVEF: 55%    LM: NORMAL    LCx: 100% PROX    LAD: 100% PROX    RCA: PLAQUE.  PATENT STENTS IN PDA, PLVB

## 2019-06-24 NOTE — DIETARY NOTE
Nutrition Short Note   Dietitian consult received per cardiac rehab standing order. Pt to be educated by cardiac rehab staff and encouraged to attend outpatient classes taught by RD. RD available PRN.      Soheila Borjas RD, LDN  Pager [phone]

## 2019-06-24 NOTE — PLAN OF CARE
Pt is alert and oriented x4, on room air, NSR/SB on the monitor, overnight pt HR drops to the low 40's mid 50's. Pt denies any cardiovascular symptoms at this time. Pt is continent of B/B, up ad yesi, ACC.      PLAN: Cath in the morning, heparin drip 7.5 gtt

## 2019-06-24 NOTE — PROCEDURES
[facility]    PATIENT'S NAME: Jose Luis Pebbles   ATTENDING PHYSICIAN: Madonna Daniels M.D. OPERATING PHYSICIAN: Pema Alcantara M.D.    PATIENT ACCOUNT#:   [de-identified]    LOCATION:  [address] [place] [address]  MEDICAL RECORD #:   [id number]       DATE OF BI the LAD occlusion point. The midportion of the LAD, which had previously been dilated in 2017 is seen to fill via collaterals during RCA injections. The right coronary artery is a large anatomically dominant vessel.   It gives rise to a medium-sized PDA an artery puncture site was closed using a Perclose device without difficulty. IMPRESSION:    1. Mild left ventricular diastolic dysfunction. 2.   Minimal segmental left ventricular systolic dysfunction. 3.   Coronary artery disease as described above.

## 2019-06-24 NOTE — PROGRESS NOTES
[facility] Pharmacy Note: Route Optimization for Pantoprazole (PROTONIX)    Patient is currently on Pantoprazole (PROTONIX) 40 mg IV every 24 hours.    The patient meets the criteria to convert to the oral equivalent as established by the IV to Oral conversion pro

## 2019-06-24 NOTE — PROGRESS NOTES
[facility] Hospitalist Progress Note     Claragabi Bozman Patient Status:  Observation    1945 MRN [id number]   [facility] 2NE-A Attending Janki Flores MD   Hosp Day # 0 PCP Shawn Titus MD     CC: follow up    SUBJECTIVE:  No CP, SOB, or Continuous Infusing Medication:  • sodium chloride     • sodium chloride Stopped (06/24/19 1524)   • Continuous dose Heparin infusion Stopped (06/24/19 8614)     PRN Medication:ondansetron HCl, morphINE sulfate **OR** morphINE sulfate, glucose **OR** G

## 2019-06-24 NOTE — PROGRESS NOTES
[facility] [address] [place] [address] Cardiology Progress Note        Florian Hernadezbandar Patient Status:  Observation    1945 MRN [id number]   [facility] 2NE-A Attending Horace Grijalva MD   Hosp Day # 0 PCP Mary Garcia MD     Subjective: No JVD, carotids 2+ no bruits. Cardiac: Regular S1S2. No S3, S4, rub, click. No murmur. Lungs: Clear to auscultation and percussion. Abdomen: Soft, non-tender. Extremities: No LE edema. No clubbing or cyanosis.     Neurologic: Alert and oriented, no PATENT        RESULTS: PTCA/STENT DIAG SVG , 90% TO 0% WITH 3.5 X 18 MM XIENCE DE STENT                 Stress Cardiolyte Study 5/2016:        - Myocardial perfusion imaging:  There is a moderate-sized, mild, reversible  defect involving the basal and mid i patent SVG to diagonal,  and nonobstructive plaquing in the RCA,  ejection fraction 60%.   ,s/p CRISTÓBAL SVG ---> DIAG, 5/2016.  ,s/p  PTCA/STENT MID PLVB , 95% TO 0% WITH 2.5 X 20 MM PROMUS DE STENT , PTCA/STENT RPDA, 99% TO 0% WITH 2.25 X 16 MM PROMUS DE STENT

## 2019-06-25 VITALS
HEIGHT: 67.99 IN | HEART RATE: 58 BPM | WEIGHT: 188.5 LBS | BODY MASS INDEX: 28.57 KG/M2 | RESPIRATION RATE: 18 BRPM | SYSTOLIC BLOOD PRESSURE: 131 MMHG | OXYGEN SATURATION: 100 % | DIASTOLIC BLOOD PRESSURE: 62 MMHG | TEMPERATURE: 98 F

## 2019-06-25 LAB
ANION GAP SERPL CALC-SCNC: 4 MMOL/L (ref 0–18)
ATRIAL RATE: 58 BPM
BASOPHILS # BLD AUTO: 0.05 X10(3) UL (ref 0–0.2)
BASOPHILS NFR BLD AUTO: 0.6 %
BUN BLD-MCNC: 15 MG/DL (ref 7–18)
BUN/CREAT SERPL: 15.3 (ref 10–20)
CALCIUM BLD-MCNC: 8.7 MG/DL (ref 8.5–10.1)
CHLORIDE SERPL-SCNC: 106 MMOL/L (ref 98–112)
CO2 SERPL-SCNC: 25 MMOL/L (ref 21–32)
CREAT BLD-MCNC: 0.98 MG/DL (ref 0.7–1.3)
DEPRECATED RDW RBC AUTO: 44 FL (ref 35.1–46.3)
EOSINOPHIL # BLD AUTO: 0.24 X10(3) UL (ref 0–0.7)
EOSINOPHIL NFR BLD AUTO: 2.8 %
ERYTHROCYTE [DISTWIDTH] IN BLOOD BY AUTOMATED COUNT: 13.6 % (ref 11–15)
GLUCOSE BLD-MCNC: 203 MG/DL (ref 70–99)
GLUCOSE BLD-MCNC: 205 MG/DL (ref 70–99)
HCT VFR BLD AUTO: 45.8 % (ref 39–53)
HGB BLD-MCNC: 15.4 G/DL (ref 13–17.5)
IMM GRANULOCYTES # BLD AUTO: 0.02 X10(3) UL (ref 0–1)
IMM GRANULOCYTES NFR BLD: 0.2 %
LYMPHOCYTES # BLD AUTO: 1.54 X10(3) UL (ref 1–4)
LYMPHOCYTES NFR BLD AUTO: 17.7 %
MCH RBC QN AUTO: 29.5 PG (ref 26–34)
MCHC RBC AUTO-ENTMCNC: 33.6 G/DL (ref 31–37)
MCV RBC AUTO: 87.7 FL (ref 80–100)
MONOCYTES # BLD AUTO: 1.17 X10(3) UL (ref 0.1–1)
MONOCYTES NFR BLD AUTO: 13.4 %
NEUTROPHILS # BLD AUTO: 5.7 X10 (3) UL (ref 1.5–7.7)
NEUTROPHILS # BLD AUTO: 5.7 X10(3) UL (ref 1.5–7.7)
NEUTROPHILS NFR BLD AUTO: 65.3 %
OSMOLALITY SERPL CALC.SUM OF ELEC: 287 MOSM/KG (ref 275–295)
P AXIS: 49 DEGREES
P-R INTERVAL: 296 MS
PLATELET # BLD AUTO: 213 10(3)UL (ref 150–450)
POTASSIUM SERPL-SCNC: 4.2 MMOL/L (ref 3.5–5.1)
Q-T INTERVAL: 442 MS
QRS DURATION: 132 MS
QTC CALCULATION (BEZET): 433 MS
R AXIS: -12 DEGREES
RBC # BLD AUTO: 5.22 X10(6)UL (ref 3.8–5.8)
SODIUM SERPL-SCNC: 135 MMOL/L (ref 136–145)
T AXIS: 20 DEGREES
VENTRICULAR RATE: 58 BPM
WBC # BLD AUTO: 8.7 X10(3) UL (ref 4–11)

## 2019-06-25 PROCEDURE — 93010 ELECTROCARDIOGRAM REPORT: CPT | Performed by: INTERNAL MEDICINE

## 2019-06-25 PROCEDURE — 80048 BASIC METABOLIC PNL TOTAL CA: CPT | Performed by: INTERNAL MEDICINE

## 2019-06-25 PROCEDURE — 93005 ELECTROCARDIOGRAM TRACING: CPT

## 2019-06-25 PROCEDURE — 99211 OFF/OP EST MAY X REQ PHY/QHP: CPT

## 2019-06-25 PROCEDURE — 82962 GLUCOSE BLOOD TEST: CPT

## 2019-06-25 PROCEDURE — 85025 COMPLETE CBC W/AUTO DIFF WBC: CPT | Performed by: INTERNAL MEDICINE

## 2019-06-25 RX ORDER — ATORVASTATIN CALCIUM 10 MG/1
10 TABLET, FILM COATED ORAL NIGHTLY
Qty: 90 TABLET | Refills: 0 | Status: SHIPPED | OUTPATIENT
Start: 2019-06-25 | End: 2019-07-03 | Stop reason: ALTCHOICE

## 2019-06-25 RX ORDER — PANTOPRAZOLE SODIUM 40 MG/1
40 TABLET, DELAYED RELEASE ORAL
Qty: 30 TABLET | Refills: 0 | Status: SHIPPED | OUTPATIENT
Start: 2019-06-26 | End: 2019-07-25

## 2019-06-25 RX ORDER — METOPROLOL SUCCINATE 25 MG/1
25 TABLET, EXTENDED RELEASE ORAL
Qty: 60 TABLET | Refills: 0 | Status: SHIPPED | OUTPATIENT
Start: 2019-06-25 | End: 2019-06-25

## 2019-06-25 NOTE — PROGRESS NOTES
NURSING DISCHARGE NOTE    Discharged Home via Wheelchair. Accompanied by RN  Belongings Taken by patient/family. Pt PIV removed with catheter intact and tele discontinued. Pt received discharge instructions and follow up information.  Medications re

## 2019-06-25 NOTE — DISCHARGE SUMMARY
General Medicine Discharge Summary     Patient ID:  Alanis Rutledge  75year old  [id number]  7/18/1945    Admit date: 6/23/2019    Discharge date and time: 6/25/19    Attending Physician: Rhiannon Bishop, *     Primary Care Physician: Mirta Bland - EKG  without acute ST changes.    - CXR no acute issues  - apprec cardiology consultation  - cath s/p PTCA/CRISTÓBAL to SVG->Diag  - IV PPI     # CAD s/p CABG, PCl, HTN, HL  - apprec cardiology recs  - cardiac medications per cardiology     # IDDM  -insulin  - Chest pain. Per [place]    lisinopril 20 MG Oral Tab  Take 1 tablet (20 mg total) by mouth daily. cholecalciferol (VITAMIN D3) 5000 units Oral Cap  Take 5,000 Units by mouth daily. aspirin 81 MG Oral Tab  Take 81 mg by mouth daily.     PRECISION X Sunny Jack MD    [facility] Hospitalist  Pager [phone]

## 2019-06-25 NOTE — PLAN OF CARE
Pt received at 0730. Alert and oriented x4. Saturating well on RA. NSR/ SB on tele. PIV patent. Denies pain or discomfort. R groin site soft, no complications. Probable d/c later today. Will continue to monitor.      Problem: CARDIOVASCULAR - ADULT  Goal:

## 2019-06-25 NOTE — PLAN OF CARE
Patient s/p angiogram.   CRISTÓBAL to yvon SVG. Right groin perclose. Patient ambulated in the phillips. Right groin site remained stable. Blood glucose 300. Patient requesting only 13 units Novolog be given instead of sliding scale.   Plan of care discussed with pa

## 2019-06-25 NOTE — PROGRESS NOTES
[facility] [address] [place] [address] Cardiology Progress Note        Mariam Quintanilla Patient Status:  Observation    1945 MRN [id number]   [facility] 2NE-A Attending Chantal Calderon MD   Hosp Day # 0 PCP Rina Lyn MD     Subjective: oriented, normal affect. Skin: Warm and dry.            LABS:      HEM:  Recent Labs   Lab 06/23/19  0831 06/24/19  0641 06/25/19  0630   WBC 8.1 9.5 8.7   HGB 15.0 15.1 15.4   HCT 44.3 45.0 45.8   .0 205.0 213.0       Chem:  Recent Labs   Lab 06/23 RFA     Cath /PCI 6/2016:        RESULTS:     PTCA/STENT MID PLVB , 95% TO 0% WITH 2.5 X 20 MM PROMUS DE STENT      PTCA/STENT RPDA, 99% TO 0% WITH 2.25 X 16 MM PROMUS DE STENT         Findings     LVEF: 60%     LM: NORMAL     LCx: 100% PROX     LAD: 100% artery and stenting of his left circumflex artery. Most  recent angiogram was in August 2017, done because of chest discomfort.  That study  demonstrated an occluded obtuse marginal artery that fills by collaterals during  RCA injections, nonobstructive rasta

## 2019-07-03 PROBLEM — R07.9 ACUTE CHEST PAIN: Status: RESOLVED | Noted: 2017-11-11 | Resolved: 2019-07-03

## 2019-07-03 PROBLEM — R55 SYNCOPE, NEAR: Status: RESOLVED | Noted: 2017-11-09 | Resolved: 2019-07-03

## 2019-07-25 ENCOUNTER — CARDPULM VISIT (OUTPATIENT)
Dept: CARDIAC REHAB | Facility: HOSPITAL | Age: 74
End: 2019-07-25
Attending: INTERNAL MEDICINE
Payer: MEDICARE

## 2019-07-25 VITALS
DIASTOLIC BLOOD PRESSURE: 62 MMHG | SYSTOLIC BLOOD PRESSURE: 108 MMHG | HEART RATE: 49 BPM | WEIGHT: 183.19 LBS | BODY MASS INDEX: 27.76 KG/M2 | OXYGEN SATURATION: 96 % | HEIGHT: 68 IN

## 2019-07-25 PROCEDURE — 93798 PHYS/QHP OP CAR RHAB W/ECG: CPT

## 2019-07-25 RX ORDER — ERGOCALCIFEROL (VITAMIN D2) 1250 MCG
50000 CAPSULE ORAL WEEKLY
COMMUNITY
End: 2020-10-01

## 2019-07-29 ENCOUNTER — CARDPULM VISIT (OUTPATIENT)
Dept: CARDIAC REHAB | Facility: HOSPITAL | Age: 74
End: 2019-07-29
Attending: INTERNAL MEDICINE
Payer: MEDICARE

## 2019-07-29 PROCEDURE — 93798 PHYS/QHP OP CAR RHAB W/ECG: CPT

## 2019-07-31 ENCOUNTER — CARDPULM VISIT (OUTPATIENT)
Dept: CARDIAC REHAB | Facility: HOSPITAL | Age: 74
End: 2019-07-31
Attending: INTERNAL MEDICINE
Payer: MEDICARE

## 2019-07-31 PROCEDURE — 93798 PHYS/QHP OP CAR RHAB W/ECG: CPT

## 2019-08-02 ENCOUNTER — CARDPULM VISIT (OUTPATIENT)
Dept: CARDIAC REHAB | Facility: HOSPITAL | Age: 74
End: 2019-08-02
Attending: INTERNAL MEDICINE
Payer: MEDICARE

## 2019-08-02 PROCEDURE — 93798 PHYS/QHP OP CAR RHAB W/ECG: CPT

## 2019-08-05 ENCOUNTER — CARDPULM VISIT (OUTPATIENT)
Dept: CARDIAC REHAB | Facility: HOSPITAL | Age: 74
End: 2019-08-05
Attending: INTERNAL MEDICINE
Payer: MEDICARE

## 2019-08-05 PROCEDURE — 93798 PHYS/QHP OP CAR RHAB W/ECG: CPT

## 2019-08-07 ENCOUNTER — CARDPULM VISIT (OUTPATIENT)
Dept: CARDIAC REHAB | Facility: HOSPITAL | Age: 74
End: 2019-08-07
Attending: INTERNAL MEDICINE
Payer: MEDICARE

## 2019-08-07 PROCEDURE — 93798 PHYS/QHP OP CAR RHAB W/ECG: CPT

## 2019-08-09 ENCOUNTER — APPOINTMENT (OUTPATIENT)
Dept: CARDIAC REHAB | Facility: HOSPITAL | Age: 74
End: 2019-08-09
Attending: INTERNAL MEDICINE
Payer: MEDICARE

## 2019-08-12 ENCOUNTER — CARDPULM VISIT (OUTPATIENT)
Dept: CARDIAC REHAB | Facility: HOSPITAL | Age: 74
End: 2019-08-12
Attending: INTERNAL MEDICINE
Payer: MEDICARE

## 2019-08-12 PROCEDURE — 93798 PHYS/QHP OP CAR RHAB W/ECG: CPT

## 2019-08-14 ENCOUNTER — CARDPULM VISIT (OUTPATIENT)
Dept: CARDIAC REHAB | Facility: HOSPITAL | Age: 74
End: 2019-08-14
Attending: INTERNAL MEDICINE
Payer: MEDICARE

## 2019-08-14 PROCEDURE — 93798 PHYS/QHP OP CAR RHAB W/ECG: CPT

## 2019-08-16 ENCOUNTER — CARDPULM VISIT (OUTPATIENT)
Dept: CARDIAC REHAB | Facility: HOSPITAL | Age: 74
End: 2019-08-16
Attending: INTERNAL MEDICINE
Payer: MEDICARE

## 2019-08-16 PROCEDURE — 93798 PHYS/QHP OP CAR RHAB W/ECG: CPT

## 2019-08-19 ENCOUNTER — CARDPULM VISIT (OUTPATIENT)
Dept: CARDIAC REHAB | Facility: HOSPITAL | Age: 74
End: 2019-08-19
Attending: INTERNAL MEDICINE
Payer: MEDICARE

## 2019-08-19 PROCEDURE — 93798 PHYS/QHP OP CAR RHAB W/ECG: CPT

## 2019-08-21 ENCOUNTER — CARDPULM VISIT (OUTPATIENT)
Dept: CARDIAC REHAB | Facility: HOSPITAL | Age: 74
End: 2019-08-21
Attending: INTERNAL MEDICINE
Payer: MEDICARE

## 2019-08-21 PROCEDURE — 93798 PHYS/QHP OP CAR RHAB W/ECG: CPT

## 2019-08-23 ENCOUNTER — CARDPULM VISIT (OUTPATIENT)
Dept: CARDIAC REHAB | Facility: HOSPITAL | Age: 74
End: 2019-08-23
Attending: INTERNAL MEDICINE
Payer: MEDICARE

## 2019-08-23 PROCEDURE — 93798 PHYS/QHP OP CAR RHAB W/ECG: CPT

## 2019-08-26 ENCOUNTER — CARDPULM VISIT (OUTPATIENT)
Dept: CARDIAC REHAB | Facility: HOSPITAL | Age: 74
End: 2019-08-26
Attending: INTERNAL MEDICINE
Payer: MEDICARE

## 2019-08-26 PROCEDURE — 93798 PHYS/QHP OP CAR RHAB W/ECG: CPT

## 2019-08-28 ENCOUNTER — CARDPULM VISIT (OUTPATIENT)
Dept: CARDIAC REHAB | Facility: HOSPITAL | Age: 74
End: 2019-08-28
Attending: INTERNAL MEDICINE
Payer: MEDICARE

## 2019-08-28 PROCEDURE — 93798 PHYS/QHP OP CAR RHAB W/ECG: CPT

## 2019-08-30 ENCOUNTER — CARDPULM VISIT (OUTPATIENT)
Dept: CARDIAC REHAB | Facility: HOSPITAL | Age: 74
End: 2019-08-30
Attending: INTERNAL MEDICINE
Payer: MEDICARE

## 2019-08-30 PROCEDURE — 93798 PHYS/QHP OP CAR RHAB W/ECG: CPT

## 2019-09-04 ENCOUNTER — CARDPULM VISIT (OUTPATIENT)
Dept: CARDIAC REHAB | Facility: HOSPITAL | Age: 74
End: 2019-09-04
Attending: INTERNAL MEDICINE
Payer: MEDICARE

## 2019-09-04 PROCEDURE — 93798 PHYS/QHP OP CAR RHAB W/ECG: CPT

## 2019-09-06 ENCOUNTER — APPOINTMENT (OUTPATIENT)
Dept: CARDIAC REHAB | Facility: HOSPITAL | Age: 74
End: 2019-09-06
Attending: INTERNAL MEDICINE
Payer: MEDICARE

## 2019-09-06 PROCEDURE — 93798 PHYS/QHP OP CAR RHAB W/ECG: CPT

## 2019-09-09 ENCOUNTER — CARDPULM VISIT (OUTPATIENT)
Dept: CARDIAC REHAB | Facility: HOSPITAL | Age: 74
End: 2019-09-09
Attending: INTERNAL MEDICINE
Payer: MEDICARE

## 2019-09-09 PROCEDURE — 93798 PHYS/QHP OP CAR RHAB W/ECG: CPT

## 2019-09-11 ENCOUNTER — APPOINTMENT (OUTPATIENT)
Dept: CARDIAC REHAB | Facility: HOSPITAL | Age: 74
End: 2019-09-11
Attending: INTERNAL MEDICINE
Payer: MEDICARE

## 2019-09-11 PROCEDURE — 93798 PHYS/QHP OP CAR RHAB W/ECG: CPT

## 2019-09-13 ENCOUNTER — APPOINTMENT (OUTPATIENT)
Dept: CARDIAC REHAB | Facility: HOSPITAL | Age: 74
End: 2019-09-13
Attending: INTERNAL MEDICINE
Payer: MEDICARE

## 2019-09-13 PROCEDURE — 93798 PHYS/QHP OP CAR RHAB W/ECG: CPT

## 2019-09-16 ENCOUNTER — APPOINTMENT (OUTPATIENT)
Dept: CARDIAC REHAB | Facility: HOSPITAL | Age: 74
End: 2019-09-16
Attending: INTERNAL MEDICINE
Payer: MEDICARE

## 2019-09-16 PROCEDURE — 93798 PHYS/QHP OP CAR RHAB W/ECG: CPT

## 2019-09-18 ENCOUNTER — APPOINTMENT (OUTPATIENT)
Dept: CARDIAC REHAB | Facility: HOSPITAL | Age: 74
End: 2019-09-18
Attending: INTERNAL MEDICINE
Payer: MEDICARE

## 2019-09-18 PROCEDURE — 93798 PHYS/QHP OP CAR RHAB W/ECG: CPT

## 2019-09-20 ENCOUNTER — CARDPULM VISIT (OUTPATIENT)
Dept: CARDIAC REHAB | Facility: HOSPITAL | Age: 74
End: 2019-09-20
Attending: INTERNAL MEDICINE
Payer: MEDICARE

## 2019-09-20 PROCEDURE — 93798 PHYS/QHP OP CAR RHAB W/ECG: CPT

## 2019-09-23 ENCOUNTER — APPOINTMENT (OUTPATIENT)
Dept: CARDIAC REHAB | Facility: HOSPITAL | Age: 74
End: 2019-09-23
Attending: INTERNAL MEDICINE
Payer: MEDICARE

## 2019-09-23 PROCEDURE — 93798 PHYS/QHP OP CAR RHAB W/ECG: CPT

## 2019-09-25 ENCOUNTER — APPOINTMENT (OUTPATIENT)
Dept: CARDIAC REHAB | Facility: HOSPITAL | Age: 74
End: 2019-09-25
Attending: INTERNAL MEDICINE
Payer: MEDICARE

## 2019-09-25 PROCEDURE — 93798 PHYS/QHP OP CAR RHAB W/ECG: CPT

## 2019-09-27 ENCOUNTER — CARDPULM VISIT (OUTPATIENT)
Dept: CARDIAC REHAB | Facility: HOSPITAL | Age: 74
End: 2019-09-27
Attending: INTERNAL MEDICINE
Payer: MEDICARE

## 2019-09-27 PROCEDURE — 93798 PHYS/QHP OP CAR RHAB W/ECG: CPT

## 2019-09-30 ENCOUNTER — APPOINTMENT (OUTPATIENT)
Dept: CARDIAC REHAB | Facility: HOSPITAL | Age: 74
End: 2019-09-30
Attending: INTERNAL MEDICINE
Payer: MEDICARE

## 2019-09-30 PROCEDURE — 93798 PHYS/QHP OP CAR RHAB W/ECG: CPT

## 2019-10-02 ENCOUNTER — CARDPULM VISIT (OUTPATIENT)
Dept: CARDIAC REHAB | Facility: HOSPITAL | Age: 74
End: 2019-10-02
Attending: INTERNAL MEDICINE
Payer: MEDICARE

## 2019-10-02 PROCEDURE — 93798 PHYS/QHP OP CAR RHAB W/ECG: CPT

## 2019-10-04 ENCOUNTER — APPOINTMENT (OUTPATIENT)
Dept: CARDIAC REHAB | Facility: HOSPITAL | Age: 74
End: 2019-10-04
Attending: INTERNAL MEDICINE
Payer: MEDICARE

## 2019-10-04 PROCEDURE — 93798 PHYS/QHP OP CAR RHAB W/ECG: CPT

## 2019-10-07 ENCOUNTER — CARDPULM VISIT (OUTPATIENT)
Dept: CARDIAC REHAB | Facility: HOSPITAL | Age: 74
End: 2019-10-07
Attending: INTERNAL MEDICINE
Payer: MEDICARE

## 2019-10-07 PROCEDURE — 93798 PHYS/QHP OP CAR RHAB W/ECG: CPT

## 2019-10-09 ENCOUNTER — APPOINTMENT (OUTPATIENT)
Dept: CARDIAC REHAB | Facility: HOSPITAL | Age: 74
End: 2019-10-09
Attending: INTERNAL MEDICINE
Payer: MEDICARE

## 2019-10-11 ENCOUNTER — APPOINTMENT (OUTPATIENT)
Dept: CARDIAC REHAB | Facility: HOSPITAL | Age: 74
End: 2019-10-11
Attending: INTERNAL MEDICINE
Payer: MEDICARE

## 2019-10-14 ENCOUNTER — APPOINTMENT (OUTPATIENT)
Dept: CARDIAC REHAB | Facility: HOSPITAL | Age: 74
End: 2019-10-14
Attending: INTERNAL MEDICINE
Payer: MEDICARE

## 2019-10-14 PROCEDURE — 93798 PHYS/QHP OP CAR RHAB W/ECG: CPT

## 2019-10-16 ENCOUNTER — APPOINTMENT (OUTPATIENT)
Dept: CARDIAC REHAB | Facility: HOSPITAL | Age: 74
End: 2019-10-16
Attending: INTERNAL MEDICINE
Payer: MEDICARE

## 2019-10-16 PROCEDURE — 93798 PHYS/QHP OP CAR RHAB W/ECG: CPT

## 2019-10-18 ENCOUNTER — CARDPULM VISIT (OUTPATIENT)
Dept: CARDIAC REHAB | Facility: HOSPITAL | Age: 74
End: 2019-10-18
Attending: INTERNAL MEDICINE
Payer: MEDICARE

## 2019-10-18 PROCEDURE — 93798 PHYS/QHP OP CAR RHAB W/ECG: CPT

## 2019-10-21 ENCOUNTER — CARDPULM VISIT (OUTPATIENT)
Dept: CARDIAC REHAB | Facility: HOSPITAL | Age: 74
End: 2019-10-21
Attending: INTERNAL MEDICINE
Payer: MEDICARE

## 2019-10-21 PROCEDURE — 93798 PHYS/QHP OP CAR RHAB W/ECG: CPT

## 2019-10-23 ENCOUNTER — CARDPULM VISIT (OUTPATIENT)
Dept: CARDIAC REHAB | Facility: HOSPITAL | Age: 74
End: 2019-10-23
Attending: INTERNAL MEDICINE
Payer: MEDICARE

## 2019-10-23 PROCEDURE — 93798 PHYS/QHP OP CAR RHAB W/ECG: CPT

## 2019-10-25 ENCOUNTER — CARDPULM VISIT (OUTPATIENT)
Dept: CARDIAC REHAB | Facility: HOSPITAL | Age: 74
End: 2019-10-25
Attending: INTERNAL MEDICINE
Payer: MEDICARE

## 2019-10-25 PROCEDURE — 93798 PHYS/QHP OP CAR RHAB W/ECG: CPT

## 2019-11-04 ENCOUNTER — HOSPITAL ENCOUNTER (EMERGENCY)
Facility: HOSPITAL | Age: 74
Discharge: HOME | End: 2019-11-05
Attending: EMERGENCY MEDICINE
Payer: MEDICARE

## 2019-11-04 DIAGNOSIS — K85.90 ACUTE PANCREATITIS, UNSPECIFIED COMPLICATION STATUS, UNSPECIFIED PANCREATITIS TYPE: Primary | ICD-10-CM

## 2019-11-04 PROCEDURE — 99285 EMERGENCY DEPT VISIT HI MDM: CPT

## 2019-11-04 PROCEDURE — 96374 THER/PROPH/DIAG INJ IV PUSH: CPT

## 2019-11-04 PROCEDURE — 96375 TX/PRO/DX INJ NEW DRUG ADDON: CPT

## 2019-11-04 PROCEDURE — 85025 COMPLETE CBC W/AUTO DIFF WBC: CPT | Performed by: EMERGENCY MEDICINE

## 2019-11-04 PROCEDURE — 80053 COMPREHEN METABOLIC PANEL: CPT | Performed by: EMERGENCY MEDICINE

## 2019-11-04 PROCEDURE — 84484 ASSAY OF TROPONIN QUANT: CPT | Performed by: EMERGENCY MEDICINE

## 2019-11-04 PROCEDURE — 93010 ELECTROCARDIOGRAM REPORT: CPT

## 2019-11-04 PROCEDURE — 83690 ASSAY OF LIPASE: CPT | Performed by: EMERGENCY MEDICINE

## 2019-11-04 PROCEDURE — 93005 ELECTROCARDIOGRAM TRACING: CPT

## 2019-11-04 RX ORDER — HYDROMORPHONE HYDROCHLORIDE 1 MG/ML
0.5 INJECTION, SOLUTION INTRAMUSCULAR; INTRAVENOUS; SUBCUTANEOUS EVERY 30 MIN PRN
Status: DISCONTINUED | OUTPATIENT
Start: 2019-11-04 | End: 2019-11-05

## 2019-11-04 RX ORDER — ONDANSETRON 2 MG/ML
4 INJECTION INTRAMUSCULAR; INTRAVENOUS ONCE
Status: COMPLETED | OUTPATIENT
Start: 2019-11-04 | End: 2019-11-04

## 2019-11-05 ENCOUNTER — APPOINTMENT (OUTPATIENT)
Dept: CT IMAGING | Facility: HOSPITAL | Age: 74
End: 2019-11-05
Attending: EMERGENCY MEDICINE
Payer: MEDICARE

## 2019-11-05 VITALS
TEMPERATURE: 98 F | OXYGEN SATURATION: 97 % | WEIGHT: 180 LBS | SYSTOLIC BLOOD PRESSURE: 135 MMHG | HEIGHT: 68 IN | HEART RATE: 56 BPM | BODY MASS INDEX: 27.28 KG/M2 | RESPIRATION RATE: 16 BRPM | DIASTOLIC BLOOD PRESSURE: 68 MMHG

## 2019-11-05 PROCEDURE — 74177 CT ABD & PELVIS W/CONTRAST: CPT | Performed by: EMERGENCY MEDICINE

## 2019-11-05 PROCEDURE — 83605 ASSAY OF LACTIC ACID: CPT | Performed by: EMERGENCY MEDICINE

## 2019-11-05 RX ORDER — HYDROCODONE BITARTRATE AND ACETAMINOPHEN 5; 325 MG/1; MG/1
1-2 TABLET ORAL EVERY 6 HOURS PRN
Qty: 10 TABLET | Refills: 0 | Status: SHIPPED | OUTPATIENT
Start: 2019-11-05 | End: 2019-11-12

## 2019-11-05 RX ORDER — HYDROCODONE BITARTRATE AND ACETAMINOPHEN 5; 325 MG/1; MG/1
2 TABLET ORAL ONCE
Status: COMPLETED | OUTPATIENT
Start: 2019-11-05 | End: 2019-11-05

## 2019-11-05 RX ORDER — ONDANSETRON 2 MG/ML
4 INJECTION INTRAMUSCULAR; INTRAVENOUS ONCE
Status: DISCONTINUED | OUTPATIENT
Start: 2019-11-05 | End: 2019-11-05

## 2019-11-05 RX ORDER — ONDANSETRON 4 MG/1
4 TABLET, ORALLY DISINTEGRATING ORAL ONCE
Status: COMPLETED | OUTPATIENT
Start: 2019-11-05 | End: 2019-11-05

## 2019-11-05 RX ORDER — ONDANSETRON 4 MG/1
4 TABLET, ORALLY DISINTEGRATING ORAL EVERY 4 HOURS PRN
Qty: 10 TABLET | Refills: 0 | Status: SHIPPED | OUTPATIENT
Start: 2019-11-05 | End: 2019-11-12

## 2019-11-05 NOTE — ED NOTES
This RN took pt to CT, prior to scan pt removed CGM, pt handed it to this RN and stated that it was disposable and he \"would be getting one soon anyway since it was 6days old\". Pt continues by stating it \"has to be thrown away since he took it off\".  P

## 2019-11-05 NOTE — ED PROVIDER NOTES
Patient Seen in: [facility] Emergency Department      History   Patient presents with:  Abdomen/Flank Pain (GI/)    Stated Complaint: ABD PAIN    HPI    16-year-old male with a history of coronary artery disease, status post CABG, history of hyper CHOLECYSTECTOMY     • COLONOSCOPY     • COLONOSCOPY      (3/09), diverticulosis, int hem   • COLONOSCOPY  4/2012    diverticulosis   • COLONOSCOPY  7/23/15    diverticulosis and enlarged internal hemorrhoids   • COLONOSCOPY N/A 4/26/2019    Performed by [facility] There is no lymphadenopathy or carotid bruit. Cardiovascular exam: Regular rate and rhythm. There are no murmurs, rubs, gallops. Lungs: Clear to auscultation bilaterally. There is no audible wheezes, Rales, rhonchi.   Abdomen: Soft, nontender, nondisten Ventricular rate 64. No acute ST elevation or depression. Rate, axis, intervals are noted. I agree with computer interpretation.     CT scan abdomen and pelvis with IV contrast:  Small amount of branching gas is present within the left hepatic lobe, most had prior history of pancreatitis, a prior history of a ERCP with pancreatic stent and stone removal in July 2008. After discussion with gastroenterology and general surgery, it was felt that the air was probably secondary to pneumobilia.     Findings  of (six) hours as needed for Pain. Qty: 10 tablet Refills: 0    ondansetron 4 MG Oral Tablet Dispersible  Take 1 tablet (4 mg total) by mouth every 4 (four) hours as needed for Nausea.   Qty: 10 tablet Refills: 0

## 2020-01-13 ENCOUNTER — ANESTHESIA EVENT (OUTPATIENT)
Dept: ENDOSCOPY | Facility: HOSPITAL | Age: 75
End: 2020-01-13
Payer: MEDICARE

## 2020-01-13 ENCOUNTER — ANESTHESIA (OUTPATIENT)
Dept: ENDOSCOPY | Facility: HOSPITAL | Age: 75
End: 2020-01-13
Payer: MEDICARE

## 2020-01-13 ENCOUNTER — HOSPITAL ENCOUNTER (OUTPATIENT)
Facility: HOSPITAL | Age: 75
Setting detail: HOSPITAL OUTPATIENT SURGERY
Discharge: HOME | End: 2020-01-13
Attending: INTERNAL MEDICINE | Admitting: INTERNAL MEDICINE
Payer: MEDICARE

## 2020-01-13 VITALS
SYSTOLIC BLOOD PRESSURE: 88 MMHG | OXYGEN SATURATION: 99 % | TEMPERATURE: 98 F | HEART RATE: 52 BPM | BODY MASS INDEX: 27.28 KG/M2 | RESPIRATION RATE: 16 BRPM | WEIGHT: 180 LBS | HEIGHT: 68 IN | DIASTOLIC BLOOD PRESSURE: 63 MMHG

## 2020-01-13 DIAGNOSIS — K86.1 CHRONIC CALCIFIC PANCREATITIS (HCC): ICD-10-CM

## 2020-01-13 LAB — GLUCOSE BLD-MCNC: 154 MG/DL (ref 70–99)

## 2020-01-13 PROCEDURE — 82962 GLUCOSE BLOOD TEST: CPT

## 2020-01-13 PROCEDURE — 0DJ08ZZ INSPECTION OF UPPER INTESTINAL TRACT, VIA NATURAL OR ARTIFICIAL OPENING ENDOSCOPIC: ICD-10-PCS | Performed by: INTERNAL MEDICINE

## 2020-01-13 RX ORDER — SODIUM CHLORIDE, SODIUM LACTATE, POTASSIUM CHLORIDE, CALCIUM CHLORIDE 600; 310; 30; 20 MG/100ML; MG/100ML; MG/100ML; MG/100ML
INJECTION, SOLUTION INTRAVENOUS CONTINUOUS
Status: DISCONTINUED | OUTPATIENT
Start: 2020-01-13 | End: 2020-01-13

## 2020-01-13 RX ORDER — LIDOCAINE HYDROCHLORIDE 10 MG/ML
INJECTION, SOLUTION EPIDURAL; INFILTRATION; INTRACAUDAL; PERINEURAL AS NEEDED
Status: DISCONTINUED | OUTPATIENT
Start: 2020-01-13 | End: 2020-01-13 | Stop reason: SURG

## 2020-01-13 RX ORDER — DEXTROSE MONOHYDRATE 25 G/50ML
50 INJECTION, SOLUTION INTRAVENOUS
Status: DISCONTINUED | OUTPATIENT
Start: 2020-01-13 | End: 2020-01-13

## 2020-01-13 RX ADMIN — LIDOCAINE HYDROCHLORIDE 50 MG: 10 INJECTION, SOLUTION EPIDURAL; INFILTRATION; INTRACAUDAL; PERINEURAL at 14:36:00

## 2020-01-13 RX ADMIN — SODIUM CHLORIDE, SODIUM LACTATE, POTASSIUM CHLORIDE, CALCIUM CHLORIDE: 600; 310; 30; 20 INJECTION, SOLUTION INTRAVENOUS at 14:56:00

## 2020-01-13 NOTE — ANESTHESIA PREPROCEDURE EVALUATION
PRE-OP EVALUATION    Patient Name: Paty Brandon    Pre-op Diagnosis: Chronic calcific pancreatitis ([facility] [address] [K86.1]    Procedure(s):  ENDOSCOPIC ULTRASOUND WITH FINE NEEDLE ASPIRATION    Surgeon(s) and Role:     Angel Morgan MD - Primary    Pre-op v (BMI 25.0-29. 9)     Coronary atherosclerosis     Acquired keratoderma     Hammer toe     GERD (gastroesophageal reflux disease)     Personal history of colonic polyps     History of pancreatitis     Nonspecific pain lumbar region     Type 2 diabetes sabinait Component Value Date    WBC 14.0 (H) 11/04/2019    RBC 5.44 11/04/2019    HGB 16.2 11/04/2019    HCT 47.3 11/04/2019    MCV 86.9 11/04/2019    MCH 29.8 11/04/2019    MCHC 34.2 11/04/2019    RDW 13.7 11/04/2019    .0 11/04/2019     Lab Results   Co

## 2020-01-13 NOTE — ANESTHESIA POSTPROCEDURE EVALUATION
[address] Patient Status:  Hospital Outpatient Surgery   Age/Gender 75year old male MRN [id number]   Location [address] Attending Narayan Venegas MD   Hosp Day # 0 PCP Sonja Veliz MD       Anesthesia Post-op Note

## 2020-04-13 ENCOUNTER — APPOINTMENT (OUTPATIENT)
Dept: CV DIAGNOSTICS | Facility: HOSPITAL | Age: 75
End: 2020-04-13
Attending: INTERNAL MEDICINE
Payer: MEDICARE

## 2020-04-13 ENCOUNTER — HOSPITAL ENCOUNTER (OUTPATIENT)
Facility: HOSPITAL | Age: 75
Setting detail: OBSERVATION
Discharge: HOME | End: 2020-04-14
Attending: EMERGENCY MEDICINE | Admitting: HOSPITALIST
Payer: MEDICARE

## 2020-04-13 ENCOUNTER — APPOINTMENT (OUTPATIENT)
Dept: GENERAL RADIOLOGY | Facility: HOSPITAL | Age: 75
End: 2020-04-13
Attending: EMERGENCY MEDICINE
Payer: MEDICARE

## 2020-04-13 DIAGNOSIS — R07.9 CHEST PAIN OF UNCERTAIN ETIOLOGY: Primary | ICD-10-CM

## 2020-04-13 PROCEDURE — 80053 COMPREHEN METABOLIC PANEL: CPT | Performed by: EMERGENCY MEDICINE

## 2020-04-13 PROCEDURE — 85379 FIBRIN DEGRADATION QUANT: CPT | Performed by: EMERGENCY MEDICINE

## 2020-04-13 PROCEDURE — 82962 GLUCOSE BLOOD TEST: CPT

## 2020-04-13 PROCEDURE — 80061 LIPID PANEL: CPT | Performed by: HOSPITALIST

## 2020-04-13 PROCEDURE — 71045 X-RAY EXAM CHEST 1 VIEW: CPT | Performed by: EMERGENCY MEDICINE

## 2020-04-13 PROCEDURE — 83036 HEMOGLOBIN GLYCOSYLATED A1C: CPT | Performed by: HOSPITALIST

## 2020-04-13 PROCEDURE — 84484 ASSAY OF TROPONIN QUANT: CPT | Performed by: EMERGENCY MEDICINE

## 2020-04-13 PROCEDURE — 36415 COLL VENOUS BLD VENIPUNCTURE: CPT

## 2020-04-13 PROCEDURE — 85025 COMPLETE CBC W/AUTO DIFF WBC: CPT | Performed by: HOSPITALIST

## 2020-04-13 PROCEDURE — 85025 COMPLETE CBC W/AUTO DIFF WBC: CPT | Performed by: EMERGENCY MEDICINE

## 2020-04-13 PROCEDURE — 93005 ELECTROCARDIOGRAM TRACING: CPT

## 2020-04-13 PROCEDURE — 93010 ELECTROCARDIOGRAM REPORT: CPT

## 2020-04-13 PROCEDURE — 99284 EMERGENCY DEPT VISIT MOD MDM: CPT

## 2020-04-13 PROCEDURE — 93306 TTE W/DOPPLER COMPLETE: CPT | Performed by: INTERNAL MEDICINE

## 2020-04-13 PROCEDURE — 83735 ASSAY OF MAGNESIUM: CPT | Performed by: HOSPITALIST

## 2020-04-13 PROCEDURE — 99285 EMERGENCY DEPT VISIT HI MDM: CPT

## 2020-04-13 PROCEDURE — 84484 ASSAY OF TROPONIN QUANT: CPT | Performed by: HOSPITALIST

## 2020-04-13 RX ORDER — DEXTROSE MONOHYDRATE 25 G/50ML
50 INJECTION, SOLUTION INTRAVENOUS
Status: DISCONTINUED | OUTPATIENT
Start: 2020-04-13 | End: 2020-04-13

## 2020-04-13 RX ORDER — ONDANSETRON 2 MG/ML
4 INJECTION INTRAMUSCULAR; INTRAVENOUS EVERY 6 HOURS PRN
Status: DISCONTINUED | OUTPATIENT
Start: 2020-04-13 | End: 2020-04-14

## 2020-04-13 RX ORDER — AMLODIPINE BESYLATE 5 MG/1
10 TABLET ORAL DAILY
Status: DISCONTINUED | OUTPATIENT
Start: 2020-04-14 | End: 2020-04-13

## 2020-04-13 RX ORDER — DEXTROSE MONOHYDRATE 25 G/50ML
50 INJECTION, SOLUTION INTRAVENOUS
Status: DISCONTINUED | OUTPATIENT
Start: 2020-04-13 | End: 2020-04-14

## 2020-04-13 RX ORDER — AMLODIPINE BESYLATE 5 MG/1
10 TABLET ORAL DAILY
Status: DISCONTINUED | OUTPATIENT
Start: 2020-04-13 | End: 2020-04-14

## 2020-04-13 RX ORDER — ACETAMINOPHEN 325 MG/1
650 TABLET ORAL EVERY 6 HOURS PRN
Status: DISCONTINUED | OUTPATIENT
Start: 2020-04-13 | End: 2020-04-14

## 2020-04-13 RX ORDER — CLOPIDOGREL BISULFATE 75 MG/1
75 TABLET ORAL DAILY
Status: DISCONTINUED | OUTPATIENT
Start: 2020-04-13 | End: 2020-04-14

## 2020-04-13 RX ORDER — AMLODIPINE BESYLATE 10 MG/1
10 TABLET ORAL DAILY
COMMUNITY

## 2020-04-13 RX ORDER — LISINOPRIL 20 MG/1
20 TABLET ORAL DAILY
Status: DISCONTINUED | OUTPATIENT
Start: 2020-04-14 | End: 2020-04-13

## 2020-04-13 RX ORDER — ZOLPIDEM TARTRATE 5 MG/1
5 TABLET ORAL NIGHTLY PRN
Status: DISCONTINUED | OUTPATIENT
Start: 2020-04-13 | End: 2020-04-14

## 2020-04-13 RX ORDER — ATORVASTATIN CALCIUM 40 MG/1
40 TABLET, FILM COATED ORAL NIGHTLY
Status: DISCONTINUED | OUTPATIENT
Start: 2020-04-13 | End: 2020-04-14

## 2020-04-13 RX ORDER — ASPIRIN 81 MG/1
81 TABLET, CHEWABLE ORAL DAILY
Status: DISCONTINUED | OUTPATIENT
Start: 2020-04-13 | End: 2020-04-14

## 2020-04-13 RX ORDER — LISINOPRIL 20 MG/1
20 TABLET ORAL DAILY
Status: DISCONTINUED | OUTPATIENT
Start: 2020-04-13 | End: 2020-04-14

## 2020-04-13 NOTE — PLAN OF CARE
Patient admitted to floor with c/o palpitations and heart racing mainly at [facility] for the past month. He stated that sometimes these palpitations occur during the day. He denies chest pain/angina.   He has a CGM, continuous glucose monitor , attached to his r baseline  Description  INTERVENTIONS:  - Continuous cardiac monitoring, monitor vital signs, obtain 12 lead EKG if indicated  - Evaluate effectiveness of antiarrhythmic and heart rate control medications as ordered  - Initiate emergency measures for life t

## 2020-04-13 NOTE — ED INITIAL ASSESSMENT (HPI)
76 yo M complaining of palpitation for about 10 minutes tonight at 11pm as he was trying to sleep. He had some dizziness, lightheaded, chest tightness, tingling of bilateral hands, chin at the same time.  He have been having this symptoms for a few times in

## 2020-04-13 NOTE — CONSULTS
[facility] [address] [place] [address] Cardiology  Report of Consultation    Leta Baker Patient Status:  Observation    1945 MRN [id number]   [facility] 2NE-A Attending Anastasia Valdez MD   Hosp Day # 0 PCP Mann Rey MD     Reason for Con with his CAD in the past.  He clearly states that these Sx are not his angina. Of note, each of his palpitation episodes feel 30 minutes in duration - no sustained arrhythmia noted on Zio to account for this duration of Sx.   Pt has had some anxiety associ mouth daily. , Disp: , Rfl:   dilTIAZem HCl ER Coated Beads (CARDIZEM CD) 180 MG Oral Capsule SR 24 Hr, Take 1 capsule (180 mg total) by mouth daily. , Disp: 30 capsule, Rfl: 0  Pancrelipase, Lip-Prot-Amyl, (CREON) 39915 units Oral Cap DR Particles, Take 2 c developed, well nourished male. Pt is in no acute distress. HEENT:   Normocephalic. Atraumatic. Eyes with no scleral icterus. Neck: Supple. No JVD. Carotids 2+ and equal in symmetric fashion. No bruits are noted. Cardiac: Regular rate and rhythm. duration to account for current Sx   -? Arrythmia   -? Anxiety  2. CAD               -S/P CABG 2001                -Multiple PCI    -CRISTÓBAL to RCA 3/19/20    -Repeat cath after Sx 3/30/20 with stable anatomy reported  3. HTN  4.  HL  5.  DM II  6.  1 AVB  7.

## 2020-04-13 NOTE — PLAN OF CARE
Patient's heart rate on arrival in the 60's to 70's. Throughout the evening his heart rate in the low 50's. Passed

## 2020-04-13 NOTE — PLAN OF CARE
Pt is alert x 4, on Ra, SB on the monitor. PT denies any cardiovascular symptoms at this time. Pt is up ad yesi, continent of B/B. All needs met and will continue to monitor. PLAN: ECHO, monitor overnight, might D/C on event monitor.       POC: Discuss baseline  Description  INTERVENTIONS:  - Continuous cardiac monitoring, monitor vital signs, obtain 12 lead EKG if indicated  - Evaluate effectiveness of antiarrhythmic and heart rate control medications as ordered  - Initiate emergency measures for life t

## 2020-04-13 NOTE — H&P
[facility] Hospitalist History and Physical      Patient presents with:  Arrythmia/Palpitations       PCP: Lisa Bustos MD      History of Present Illness: Patient is a 75year old male with PMH sig for CAD sp CABG, multiple prior PCIs, HTN, HL, obesity, DM2, pre enlarged internal hemorrhoids   • COLONOSCOPY N/A 4/26/2019    Performed by Joi Vasquez MD at [facility] ENDOSCOPY   • COLONOSCOPY N/A 7/23/2015    Performed by Joi Vasquez MD at [facility] N/A 1/13/2020    Performed by enlargment/tenderness/nodules appreciated   Lungs:   Clear to auscultation bilaterally. Normal effort   Chest wall:  No tenderness or deformity.    Heart:  Regular rate and rhythm, S1, S2 normal, no murmur, rub or gallop appreciated   Abdomen:   Soft, non-t small left pleural effusion. A preliminary report was provided by the [facility] teleradiology service.   Dictated by: Chris Briggs MD on 4/13/2020 at 5:44 AM     Finalized by: Chris Briggs MD on 4/13/2020 at 5:45 AM             Assessment/Plan:     #

## 2020-04-13 NOTE — PROGRESS NOTES
04/13/20 0345 04/13/20 0347 04/13/20 0348   Vital Signs   Temp src Oral  --   --    Pulse 64 66 76   Heart Rate Source Monitor Monitor Monitor   Resp 18 18 18   Respiratory Quality Normal Normal Normal   /74 144/70 112/60   BP Location Left arm Le

## 2020-04-14 VITALS
HEART RATE: 66 BPM | WEIGHT: 179.44 LBS | TEMPERATURE: 98 F | OXYGEN SATURATION: 95 % | BODY MASS INDEX: 27 KG/M2 | DIASTOLIC BLOOD PRESSURE: 68 MMHG | SYSTOLIC BLOOD PRESSURE: 125 MMHG | RESPIRATION RATE: 18 BRPM

## 2020-04-14 PROCEDURE — 82962 GLUCOSE BLOOD TEST: CPT

## 2020-04-14 NOTE — PLAN OF CARE
At 2022, patient stated that he felt palpitations, slight tingling in his fingers, and a bit dizzy. Heart rate of 58, sinus maine, blood pressure 133/67. Blood glucose 226. Patient sitting at the side of his bed.  He stated that he occasionally gets [place] baseline  Description  INTERVENTIONS:  - Continuous cardiac monitoring, monitor vital signs, obtain 12 lead EKG if indicated  - Evaluate effectiveness of antiarrhythmic and heart rate control medications as ordered  - Initiate emergency measures for life t

## 2020-04-14 NOTE — PROGRESS NOTES
Explained discharge instructions including medications and follow ups to the patient, verbalize understanding. Set up with orders to return for 30 day event monitor tomorrow.  PIV removed, tele monitor discontinued, will be transported via wheelchair to bell

## 2020-04-14 NOTE — PLAN OF CARE
Assumed patient care at 0730 this AM. Patient A&O x4, up ad yesi, tolerating well. SPO2 maintained on RA, no c/o SOB. NSR on tele, on Plavix. Denies Cp/dizziness/ tingling of hands at this time. Pt is continent of B&B.  CGM RUQ, clean and dry, no redness- BG of cardiac arrhythmias or at baseline  Description  INTERVENTIONS:  - Continuous cardiac monitoring, monitor vital signs, obtain 12 lead EKG if indicated  - Evaluate effectiveness of antiarrhythmic and heart rate control medications as ordered  - Initiate

## 2020-04-14 NOTE — HOME CARE LIAISON
Received referral from NEVA Daly  . Called patient to discuss home health services and offer choice. Patient declines [facility] at this time. NEVA updated.

## 2020-04-14 NOTE — PROGRESS NOTES
[facility] Hospitalist Progress Note                                                                   [address]  7/18/1945    SUBJECTIVE:  Had an episode of \"low grade\" palpitations yeste Glucose-Vitamin C **OR** dextrose **OR** glucose **OR** Glucose-Vitamin C, zolpidem    Assessment/Plan:  Principal Problem:    Chest pain of uncertain etiology      # chest pain/palpitations  # CAD sp cabg, multiple PCIs  - possible arrythmia though erinn ochoa

## 2020-04-14 NOTE — PROGRESS NOTES
Cards    FU: arrythmia    One event overnight. No angina. No PERALTA. Palps lasted for several minutes. No syncope.     Past Medical History:   Diagnosis Date   • Atherosclerosis of coronary artery 08/31/2001    Dx - bypass   • CAD (coronary artery

## 2020-04-15 ENCOUNTER — HOSPITAL ENCOUNTER (OUTPATIENT)
Dept: CV DIAGNOSTICS | Facility: HOSPITAL | Age: 75
Discharge: HOME | End: 2020-04-15
Attending: INTERNAL MEDICINE
Payer: MEDICARE

## 2020-04-15 DIAGNOSIS — R07.9 CHEST PAIN OF UNCERTAIN ETIOLOGY: ICD-10-CM

## 2020-04-15 PROBLEM — I47.29 NSVT (NONSUSTAINED VENTRICULAR TACHYCARDIA) (HCC): Status: ACTIVE | Noted: 2020-04-15

## 2020-04-15 PROBLEM — I47.1 PSVT (PAROXYSMAL SUPRAVENTRICULAR TACHYCARDIA) (HCC): Status: ACTIVE | Noted: 2020-04-15

## 2020-04-15 PROBLEM — I47.10 PSVT (PAROXYSMAL SUPRAVENTRICULAR TACHYCARDIA) (HCC): Status: ACTIVE | Noted: 2020-04-15

## 2020-04-15 PROBLEM — I47.2 NSVT (NONSUSTAINED VENTRICULAR TACHYCARDIA) (HCC): Status: ACTIVE | Noted: 2020-04-15

## 2020-04-15 PROBLEM — I47.29 NSVT (NONSUSTAINED VENTRICULAR TACHYCARDIA): Status: ACTIVE | Noted: 2020-04-15

## 2020-04-15 PROCEDURE — 93271 ECG/MONITORING AND ANALYSIS: CPT | Performed by: INTERNAL MEDICINE

## 2020-04-15 PROCEDURE — 93228 REMOTE 30 DAY ECG REV/REPORT: CPT | Performed by: INTERNAL MEDICINE

## 2020-04-15 NOTE — CONSULTS
[facility]    PATIENT'S NAME: Nuha Barksdale   ATTENDING PHYSICIAN: CARISA Alexander PHYSICIAN: Zena Cross M.D.    PATIENT ACCOUNT#:   [de-identified]    LOCATION:  [address] [place] [address]  MEDICAL RECORD #:   [id number]       DATE OF BI which he quit in 1985. He has hypertension, diabetes, and hyperlipidemia. PAST MEDICAL HISTORY:  CAD, outlined above. Cholecystectomy. Hypertension. Hyperlipidemia. Diabetes. Pancreatic insufficiency. Tonsillectomy.     MEDICATIONS:  On admission, stents. PLAN:    1. With his VT and PAT would continue with beta-blockers. He has an echo from a couple days ago that is normal.  He has been revascularized, and these appear to be asymptomatic.   2.   For his constellation of symptoms outlined above,

## 2020-08-05 PROBLEM — I25.119 CORONARY ARTERY DISEASE INVOLVING NATIVE HEART WITH ANGINA PECTORIS, UNSPECIFIED VESSEL OR LESION TYPE (HCC): Status: ACTIVE | Noted: 2020-08-05

## 2020-08-05 PROBLEM — I25.119 CORONARY ARTERY DISEASE INVOLVING NATIVE HEART WITH ANGINA PECTORIS, UNSPECIFIED VESSEL OR LESION TYPE: Status: ACTIVE | Noted: 2020-08-05

## 2020-10-01 PROBLEM — I77.9 BILATERAL CAROTID ARTERY DISEASE (HCC): Status: ACTIVE | Noted: 2020-10-01

## 2020-10-01 PROBLEM — I77.9 RIGHT-SIDED CAROTID ARTERY DISEASE (HCC): Status: ACTIVE | Noted: 2020-10-01

## 2020-10-01 PROBLEM — I77.9 BILATERAL CAROTID ARTERY DISEASE: Status: ACTIVE | Noted: 2020-10-01

## 2020-10-01 PROBLEM — I77.9 RIGHT-SIDED CAROTID ARTERY DISEASE: Status: ACTIVE | Noted: 2020-10-01

## 2020-10-01 PROBLEM — K86.1 CHRONIC CALCIFIC PANCREATITIS (HCC): Status: ACTIVE | Noted: 2020-10-01

## 2021-05-27 PROBLEM — R13.19 ESOPHAGEAL DYSPHAGIA: Status: ACTIVE | Noted: 2021-05-27

## 2021-06-23 PROBLEM — I25.10 CORONARY ARTERY DISEASE INVOLVING NATIVE CORONARY ARTERY OF NATIVE HEART WITHOUT ANGINA PECTORIS: Status: ACTIVE | Noted: 2020-08-05

## 2021-07-25 NOTE — OPERATIVE REPORT
PATIENT NAME: Geovanna Garcia  MRN: [id number]  DATE OF OPERATION: 7/26/2021  REFERRING PHYSICIAN: Dr. Juana Taylor  Medications:  Versed 5 mg IVP              Fentanyl 100 mcg IVP  PREOPERATIVE DIAGNOSIS   Dysphagia  POSTOPERATIVE DIAGNOSIS:  1. LA grade C esophag gastroscope was removed from the patient. The procedure was completed. The patient tolerated the procedure well. A trained sedation nurse was present to assist in monitoring the patient during the entire length of moderate sedation time.     Total modera

## 2021-07-25 NOTE — H&P
Allan Davalos present for evaluation and management of dysphagia. Patient currently has complaints of solid food dysphagia, present for the last 6 months, 3 - 4 times per month, mainly dry foods. No episodes of vomiting and esophogeal obstruction.   Read Suzette course and caliber. Normal esophageal peristalsis was demonstrated throughout the course of the examination. No esophageal fold thickening, narrowing, filling defect, stricture, or abnormal mass effect.    Upright AP/lateral evaluation of the cervical e scale as 2. (1 - excellent > 95% mucosa seen; 2 - good - clear liquid up to 25% of the mucosa, 90% mucosa seen;  3 - fair - semisolid stool not suctioned, but 90% of the mucosa seen; 4 - poor - semisolid stool not suctioned, but < 90% mucosa seen; 5 - inad       • PRECISION XTRA BLOOD GLUCOSE VI STRP CHECK BLOODSUGAR THREE TIMES DAILY BEFORE EACH MEAL 100 Strip 3   • NOVOLOG 100 UNIT/ML SC SOLN 15 breakfast 15 lunch 30 dinner       • PLAVIX 75 MG OR TABS Take 75 mg by mouth daily.         • LANTUS SC Inject 5/13/2015     Procedure: LAPAROSCOPIC CHOLECYSTECTOMY;  Surgeon: Rosa tSover;   Location: [address] [place] [address] MAIN OR   • OTHER SURGICAL HISTORY   2008     ercp x 3 with removal of pancreatic duct stones   • TONSILLECTOMY          Social History    Tobacco Use      [facility] cardiology to approve holding plavix for 5 days prior to the endoscopy.

## 2021-07-26 ENCOUNTER — HOSPITAL ENCOUNTER (OUTPATIENT)
Facility: HOSPITAL | Age: 76
Setting detail: HOSPITAL OUTPATIENT SURGERY
Discharge: HOME | End: 2021-07-26
Attending: INTERNAL MEDICINE | Admitting: INTERNAL MEDICINE
Payer: MEDICARE

## 2021-07-26 VITALS
RESPIRATION RATE: 20 BRPM | OXYGEN SATURATION: 97 % | DIASTOLIC BLOOD PRESSURE: 71 MMHG | SYSTOLIC BLOOD PRESSURE: 106 MMHG | TEMPERATURE: 98 F | BODY MASS INDEX: 27.28 KG/M2 | HEART RATE: 64 BPM | HEIGHT: 68 IN | WEIGHT: 180 LBS

## 2021-07-26 DIAGNOSIS — R13.19 ESOPHAGEAL DYSPHAGIA: ICD-10-CM

## 2021-07-26 PROBLEM — K22.2 STRICTURE AND STENOSIS OF ESOPHAGUS: Status: ACTIVE | Noted: 2021-07-26

## 2021-07-26 LAB — GLUCOSE BLD-MCNC: 210 MG/DL (ref 70–99)

## 2021-07-26 PROCEDURE — 82962 GLUCOSE BLOOD TEST: CPT

## 2021-07-26 PROCEDURE — 0D758ZZ DILATION OF ESOPHAGUS, VIA NATURAL OR ARTIFICIAL OPENING ENDOSCOPIC: ICD-10-PCS | Performed by: INTERNAL MEDICINE

## 2021-07-26 PROCEDURE — 99153 MOD SED SAME PHYS/QHP EA: CPT | Performed by: INTERNAL MEDICINE

## 2021-07-26 PROCEDURE — 88305 TISSUE EXAM BY PATHOLOGIST: CPT | Performed by: INTERNAL MEDICINE

## 2021-07-26 PROCEDURE — 0DB48ZX EXCISION OF ESOPHAGOGASTRIC JUNCTION, VIA NATURAL OR ARTIFICIAL OPENING ENDOSCOPIC, DIAGNOSTIC: ICD-10-PCS | Performed by: INTERNAL MEDICINE

## 2021-07-26 PROCEDURE — 99152 MOD SED SAME PHYS/QHP 5/>YRS: CPT | Performed by: INTERNAL MEDICINE

## 2021-07-26 RX ORDER — SODIUM CHLORIDE, SODIUM LACTATE, POTASSIUM CHLORIDE, CALCIUM CHLORIDE 600; 310; 30; 20 MG/100ML; MG/100ML; MG/100ML; MG/100ML
INJECTION, SOLUTION INTRAVENOUS CONTINUOUS
Status: DISCONTINUED | OUTPATIENT
Start: 2021-07-26 | End: 2021-07-26

## 2021-07-26 RX ORDER — MIDAZOLAM HYDROCHLORIDE 1 MG/ML
INJECTION INTRAMUSCULAR; INTRAVENOUS
Status: DISCONTINUED | OUTPATIENT
Start: 2021-07-26 | End: 2021-07-26

## 2021-07-26 NOTE — BRIEF OP NOTE
Pre-Operative Diagnosis: Esophageal dysphagia [R13.10]     Post-Operative Diagnosis: * No post-op diagnosis entered *      Procedure Performed:   ESOPHAGOGASTRODUODENOSCOPY (EGD)WITH  DILATION, WITH BIOPSY    Surgeon(s) and Role:     * Syd Narayan MD

## 2021-07-28 NOTE — PROGRESS NOTES
Here are the biopsy/pathology findings from your recent EGD (Upper  Endoscopy): The esophageal biopsies showed changes related to gastroesophageal reflux. Follow-up information:  Continue omeprazole 20 mg per day.     If you need any further assistance,

## 2021-12-06 ENCOUNTER — HOSPITAL ENCOUNTER (EMERGENCY)
Facility: HOSPITAL | Age: 76
Discharge: HOME | End: 2021-12-06
Attending: EMERGENCY MEDICINE
Payer: MEDICARE

## 2021-12-06 VITALS
OXYGEN SATURATION: 94 % | TEMPERATURE: 97 F | HEART RATE: 58 BPM | BODY MASS INDEX: 28.04 KG/M2 | HEIGHT: 68 IN | WEIGHT: 185 LBS | DIASTOLIC BLOOD PRESSURE: 56 MMHG | SYSTOLIC BLOOD PRESSURE: 133 MMHG | RESPIRATION RATE: 15 BRPM

## 2021-12-06 DIAGNOSIS — I10 HYPERTENSION, UNSPECIFIED TYPE: Primary | ICD-10-CM

## 2021-12-06 PROCEDURE — 93005 ELECTROCARDIOGRAM TRACING: CPT

## 2021-12-06 PROCEDURE — 80053 COMPREHEN METABOLIC PANEL: CPT

## 2021-12-06 PROCEDURE — 85025 COMPLETE CBC W/AUTO DIFF WBC: CPT

## 2021-12-06 PROCEDURE — 36415 COLL VENOUS BLD VENIPUNCTURE: CPT

## 2021-12-06 PROCEDURE — 84484 ASSAY OF TROPONIN QUANT: CPT | Performed by: EMERGENCY MEDICINE

## 2021-12-06 PROCEDURE — 85025 COMPLETE CBC W/AUTO DIFF WBC: CPT | Performed by: EMERGENCY MEDICINE

## 2021-12-06 PROCEDURE — 84443 ASSAY THYROID STIM HORMONE: CPT | Performed by: EMERGENCY MEDICINE

## 2021-12-06 PROCEDURE — 99283 EMERGENCY DEPT VISIT LOW MDM: CPT

## 2021-12-06 PROCEDURE — 93010 ELECTROCARDIOGRAM REPORT: CPT

## 2021-12-06 PROCEDURE — 80053 COMPREHEN METABOLIC PANEL: CPT | Performed by: EMERGENCY MEDICINE

## 2021-12-06 PROCEDURE — 81001 URINALYSIS AUTO W/SCOPE: CPT | Performed by: EMERGENCY MEDICINE

## 2021-12-06 PROCEDURE — 99284 EMERGENCY DEPT VISIT MOD MDM: CPT

## 2021-12-06 NOTE — ED INITIAL ASSESSMENT (HPI)
Patient presents to ED for evaluation of HTN. Patient reports he has been managing this at home for about a week and a half now.  He presents to the ED today specifically because he's experiencing some tingling in both hands and his face as well as some diz

## 2021-12-07 ENCOUNTER — APPOINTMENT (OUTPATIENT)
Dept: GENERAL RADIOLOGY | Facility: HOSPITAL | Age: 76
End: 2021-12-07
Attending: EMERGENCY MEDICINE
Payer: MEDICARE

## 2021-12-07 ENCOUNTER — HOSPITAL ENCOUNTER (OUTPATIENT)
Facility: HOSPITAL | Age: 76
Setting detail: OBSERVATION
Discharge: HOME | End: 2021-12-08
Attending: EMERGENCY MEDICINE | Admitting: INTERNAL MEDICINE
Payer: MEDICARE

## 2021-12-07 DIAGNOSIS — F41.9 ANXIETY: ICD-10-CM

## 2021-12-07 DIAGNOSIS — R73.9 HYPERGLYCEMIA: ICD-10-CM

## 2021-12-07 DIAGNOSIS — R07.9 ACUTE CHEST PAIN: Primary | ICD-10-CM

## 2021-12-07 DIAGNOSIS — I16.0 HYPERTENSIVE URGENCY: ICD-10-CM

## 2021-12-07 PROCEDURE — 84244 ASSAY OF RENIN: CPT | Performed by: INTERNAL MEDICINE

## 2021-12-07 PROCEDURE — 80053 COMPREHEN METABOLIC PANEL: CPT | Performed by: EMERGENCY MEDICINE

## 2021-12-07 PROCEDURE — 93010 ELECTROCARDIOGRAM REPORT: CPT

## 2021-12-07 PROCEDURE — 99285 EMERGENCY DEPT VISIT HI MDM: CPT

## 2021-12-07 PROCEDURE — 85730 THROMBOPLASTIN TIME PARTIAL: CPT | Performed by: EMERGENCY MEDICINE

## 2021-12-07 PROCEDURE — 36415 COLL VENOUS BLD VENIPUNCTURE: CPT

## 2021-12-07 PROCEDURE — 96372 THER/PROPH/DIAG INJ SC/IM: CPT

## 2021-12-07 PROCEDURE — 85610 PROTHROMBIN TIME: CPT | Performed by: EMERGENCY MEDICINE

## 2021-12-07 PROCEDURE — 82088 ASSAY OF ALDOSTERONE: CPT | Performed by: INTERNAL MEDICINE

## 2021-12-07 PROCEDURE — 85025 COMPLETE CBC W/AUTO DIFF WBC: CPT | Performed by: EMERGENCY MEDICINE

## 2021-12-07 PROCEDURE — 93005 ELECTROCARDIOGRAM TRACING: CPT

## 2021-12-07 PROCEDURE — 71045 X-RAY EXAM CHEST 1 VIEW: CPT | Performed by: EMERGENCY MEDICINE

## 2021-12-07 PROCEDURE — 84484 ASSAY OF TROPONIN QUANT: CPT | Performed by: EMERGENCY MEDICINE

## 2021-12-07 PROCEDURE — 82962 GLUCOSE BLOOD TEST: CPT

## 2021-12-07 PROCEDURE — 84484 ASSAY OF TROPONIN QUANT: CPT | Performed by: HOSPITALIST

## 2021-12-07 RX ORDER — PANTOPRAZOLE SODIUM 40 MG/1
40 TABLET, DELAYED RELEASE ORAL
Status: DISCONTINUED | OUTPATIENT
Start: 2021-12-07 | End: 2021-12-08

## 2021-12-07 RX ORDER — LISINOPRIL 20 MG/1
20 TABLET ORAL DAILY
Status: DISCONTINUED | OUTPATIENT
Start: 2021-12-07 | End: 2021-12-07

## 2021-12-07 RX ORDER — ASPIRIN 81 MG/1
TABLET, CHEWABLE ORAL
Status: DISPENSED
Start: 2021-12-07 | End: 2021-12-07

## 2021-12-07 RX ORDER — AMLODIPINE BESYLATE 5 MG/1
10 TABLET ORAL DAILY
Status: DISCONTINUED | OUTPATIENT
Start: 2021-12-07 | End: 2021-12-08

## 2021-12-07 RX ORDER — ASPIRIN 81 MG/1
81 TABLET ORAL DAILY
Status: DISCONTINUED | OUTPATIENT
Start: 2021-12-07 | End: 2021-12-08

## 2021-12-07 RX ORDER — ATORVASTATIN CALCIUM 40 MG/1
40 TABLET, FILM COATED ORAL NIGHTLY
Status: DISCONTINUED | OUTPATIENT
Start: 2021-12-07 | End: 2021-12-08

## 2021-12-07 RX ORDER — LABETALOL 100 MG/1
100 TABLET, FILM COATED ORAL EVERY 8 HOURS SCHEDULED
Status: DISCONTINUED | OUTPATIENT
Start: 2021-12-07 | End: 2021-12-08

## 2021-12-07 RX ORDER — LISINOPRIL 20 MG/1
20 TABLET ORAL 2 TIMES DAILY
Status: DISCONTINUED | OUTPATIENT
Start: 2021-12-07 | End: 2021-12-08

## 2021-12-07 RX ORDER — CLOPIDOGREL BISULFATE 75 MG/1
75 TABLET ORAL DAILY
Status: DISCONTINUED | OUTPATIENT
Start: 2021-12-07 | End: 2021-12-08

## 2021-12-07 RX ORDER — HEPARIN SODIUM 5000 [USP'U]/ML
5000 INJECTION, SOLUTION INTRAVENOUS; SUBCUTANEOUS EVERY 8 HOURS SCHEDULED
Status: DISCONTINUED | OUTPATIENT
Start: 2021-12-07 | End: 2021-12-08

## 2021-12-07 RX ORDER — DEXTROSE MONOHYDRATE 25 G/50ML
50 INJECTION, SOLUTION INTRAVENOUS
Status: DISCONTINUED | OUTPATIENT
Start: 2021-12-07 | End: 2021-12-08

## 2021-12-07 RX ORDER — NITROGLYCERIN 0.4 MG/1
0.4 TABLET SUBLINGUAL EVERY 5 MIN PRN
Status: DISCONTINUED | OUTPATIENT
Start: 2021-12-07 | End: 2021-12-08

## 2021-12-07 NOTE — CONSULTS
[facility] Cardiology Consultation    Jacklyn Irene Patient Status:  Observation    1945 MRN [id number]   [facility] 8NE-A Attending Gerson Mancuso MD   [facility] Day # 0 PCP Mina Moya MD     Reason for Consultation:  HTN, chest discomfort metal and drug eluting   • CATH DRUG ELUTING STENT  2006     total of 8 stents with mix of bare metal and drug eluting   • CHOLECYSTECTOMY     • COLONOSCOPY     • COLONOSCOPY      (3/09), diverticulosis, int hem   • COLONOSCOPY  4/2012    diverticulosis HPI.    Physical Exam:      Wt Readings from Last 3 Encounters:  12/07/21 : 182 lb 3.2 oz (82.6 kg)  12/06/21 : 185 lb (83.9 kg)  11/30/21 : 189 lb (85.7 kg)       12/07/21  0630 12/07/21  0712 12/07/21  0715 12/07/21  0718   BP: 148/67 150/71 143/72 126/6            Impression:    1. Hypertension . Recently  poorly-controlled. Associated with development of chest tightness. 2. CAD, s/p CABG, multi vessel PCI. Last PCI was in 3/2020. No effort-related sxs. 3. Diabetes    4. Dyslipidemia     5.  PS

## 2021-12-07 NOTE — H&P
[facility]    History and [address] Patient Status:  Observation    1945 MRN [id number]   [facility] 8NE-A Attending Elia Patton MD   [facility] Day # 0 PCP Brooke Jauregui MD     Chief Complaint: Chest Pain and e history of colonic polyps    • Problems with swallowing    • PSVT (paroxysmal supraventricular tachycardia) ([facility] [address] 4/15/2020   • Visual impairment     glasses        Past Surgical History:   Past Surgical History:   Procedure Laterality Date   • ANGIOGRAM living  0 sisters    Allergies: No Known Allergies    Medications:  No current facility-administered medications on file prior to encounter.   omeprazole 20 MG Oral Capsule Delayed Release, 20 mg po qAM, Disp: 90 capsule, Rfl: 5  Pancrelipase, Lip-Prot-Amyl and Back: No tenderness or deformity. Abdomen: Soft, nontender, nondistended. Positive bowel sounds. No rebound, guarding   Neurologic: No focal neurological deficits. CNII-XII grossly intact.  Sensation and strength intact  Musculoskeletal: Moves all ext chart  · Harriett: none    Plan of care discussed with patient and staff    Dispo: no discharge    Enrique Cutler MD  [facility]  [phone]

## 2021-12-07 NOTE — PROGRESS NOTES
ADMISSION NOTE    Pt. Alert and Oriented to room. Call light in reach, NPO until cardiology has come to see. Tele monitor on.  VS taken. Head to toe complete.   Admission Navigators compl

## 2021-12-07 NOTE — ED QUICK NOTES
Orders for admission, patient is aware of plan and ready to go upstairs. Any questions, please call ED RN burak   at extension [phone]    Vaccinated?  yes  Type of COVID test sent: rapid  COVID Suspicion level: low Low/High      Titratable drug(s) infusing: no

## 2021-12-07 NOTE — ED PROVIDER NOTES
Patient Seen in: [facility] Emergency Department      History   Patient presents with:  Hypertension  Dizziness  Numbness Weakness    Stated Complaint: htn, tingling to face     Subjective:   HPI    Patient is a 27-year-old male with a history of by bare metal and drug eluting   • CATH DRUG ELUTING STENT  2006     total of 8 stents with mix of bare metal and drug eluting   • CHOLECYSTECTOMY     • COLONOSCOPY     • COLONOSCOPY      (3/09), diverticulosis, int hem   • COLONOSCOPY  4/2012    diverticulos meningismus. LUNGS: Lungs clear to auscultation bilaterally. CARDIOVASCULAR: + S1-S2, regular rate and rhythm, no murmurs. BACK: No CVA tenderness, no midline bony tenderness. ABDOMEN: + Bowel sounds, soft, nontender, nondistended.   No rebound, no guar troponin. Patient does have an appointment tomorrow with cardiology. Did speak with cardiology who agreed to keep his appointment record blood pressures for follow-up. Return if new or worse symptoms.                          Disposition and Plan     Cl

## 2021-12-07 NOTE — PLAN OF CARE
Received patient at 730 from ER. Alert and oriented x 4 tele rhythm SR with first degree AV block. 02 saturation WNL on room air . .. Sylvain De La Torre Sylvain De La Torre breath sounds clear. Bed is locked and in low position. Call light and personal item within reach.   No complaint of

## 2021-12-07 NOTE — ED PROVIDER NOTES
Patient Seen in: [facility] Emergency Department      History   Patient presents with:  Chest Pain Angina    Stated Complaint: chest pain     Subjective:   Patient is a 44-year-old male with history of coronary artery disease with prior bypass in 21 no vomiting. Primary symptoms comment: No diaphoresis. Pertinent negatives for associated symptoms include no diaphoresis. He tried nitroglycerin for the symptoms. Risk factors include being elderly, male gender and stress.    His past medical history is COLONOSCOPY;  Surgeon: Thien Chao MD;  Location: [facility] ENDOSCOPY   • LAPAROSCOPIC CHOLECYSTECTOMY N/A 5/13/2015    Procedure: LAPAROSCOPIC CHOLECYSTECTOMY;  Surgeon: Hilary Paulson;   Location: [facility] MAIN OR   • OTHER SURGICAL HISTORY  2008    ercp x 3 with Constitutional:       General: He is not in acute distress. Appearance: He is well-developed and normal weight. He is not ill-appearing or toxic-appearing.       Comments: Patient is wearing 2 vial containers over his chest wall that contain his home Differential With Platelet.   Procedure                               Abnormality         Status                     ---------                               -----------         ------                     CBC W/ DIFFERENTIAL[[id number]]          Abnormal pain-free after nitro by EMS and is currently wearing Nitropaste.   His initial troponin and EKG are within normal limits but given his presentation and the improvement with nitro feel patient is best to be brought into the hospital for observation overnigh

## 2021-12-07 NOTE — PROGRESS NOTES
Assumed care of patient at 80 from [facility]. Patient resting comfortably in bed. Awaiting cardiology consult for additional POC direction. All questions answered at bedside handoff.       Dr. Staley Player rounded; ordered US kidneys, adjusted cardiac

## 2021-12-07 NOTE — ED INITIAL ASSESSMENT (HPI)
Pt arrived to ED per EMS due to HTN and chest tightness that started at 3am. EMS arrived and gave patient nitroglycerin spray which patient states pain was relieved. Patient is A&Ox3 and able to speak in full sentences.

## 2021-12-08 ENCOUNTER — APPOINTMENT (OUTPATIENT)
Dept: ULTRASOUND IMAGING | Facility: HOSPITAL | Age: 76
End: 2021-12-08
Attending: INTERNAL MEDICINE
Payer: MEDICARE

## 2021-12-08 VITALS
WEIGHT: 182.19 LBS | HEART RATE: 69 BPM | BODY MASS INDEX: 28 KG/M2 | SYSTOLIC BLOOD PRESSURE: 117 MMHG | TEMPERATURE: 98 F | DIASTOLIC BLOOD PRESSURE: 68 MMHG | OXYGEN SATURATION: 98 % | RESPIRATION RATE: 16 BRPM

## 2021-12-08 PROCEDURE — 80048 BASIC METABOLIC PNL TOTAL CA: CPT | Performed by: INTERNAL MEDICINE

## 2021-12-08 PROCEDURE — 96372 THER/PROPH/DIAG INJ SC/IM: CPT

## 2021-12-08 PROCEDURE — 76775 US EXAM ABDO BACK WALL LIM: CPT | Performed by: INTERNAL MEDICINE

## 2021-12-08 PROCEDURE — 82962 GLUCOSE BLOOD TEST: CPT

## 2021-12-08 PROCEDURE — 85025 COMPLETE CBC W/AUTO DIFF WBC: CPT | Performed by: INTERNAL MEDICINE

## 2021-12-08 PROCEDURE — 93975 VASCULAR STUDY: CPT | Performed by: INTERNAL MEDICINE

## 2021-12-08 RX ORDER — LABETALOL 100 MG/1
100 TABLET, FILM COATED ORAL EVERY 8 HOURS SCHEDULED
Qty: 270 TABLET | Refills: 3 | Status: SHIPPED | OUTPATIENT
Start: 2021-12-08

## 2021-12-08 RX ORDER — LISINOPRIL 20 MG/1
20 TABLET ORAL 2 TIMES DAILY
Qty: 180 TABLET | Refills: 3 | Status: SHIPPED | OUTPATIENT
Start: 2021-12-08 | End: 2021-12-08

## 2021-12-08 RX ORDER — LABETALOL 100 MG/1
100 TABLET, FILM COATED ORAL EVERY 8 HOURS SCHEDULED
Qty: 270 TABLET | Refills: 3 | Status: SHIPPED | OUTPATIENT
Start: 2021-12-08 | End: 2021-12-08

## 2021-12-08 RX ORDER — LISINOPRIL 20 MG/1
20 TABLET ORAL 2 TIMES DAILY
Qty: 180 TABLET | Refills: 3 | Status: SHIPPED | OUTPATIENT
Start: 2021-12-08 | End: 2022-01-12

## 2021-12-08 NOTE — PROGRESS NOTES
[facility] Cardiology Progress Note        Marylou Kirkland Patient Status:  Observation    1945 MRN [id number]   [facility] 8NE-A Attending Kenzie Kitchen MD   Hosp Day # 0 PCP Hyacinth Segura MD     Subjective:  T Soft, non-tender. Extremities: No LE edema. No clubbing or cyanosis. Neurologic: Alert and oriented, normal affect. Skin: Warm and dry.            LABS:      HEM:  Recent Labs   Lab 12/06/21  1521 12/07/21  0412 12/08/21  0516   WBC 10.9 7.7 8.4   HG pending.     3. ,bp check in office in 2 weeks      Gilberto Steele MD  12/8/2021  9:08 AM

## 2021-12-08 NOTE — PLAN OF CARE
Pt is ok to discharge per primary and consults. Discharge instructions including meds & follow ups given. Patient verbalizes understanding & questions answered. IV removed, tele monitor discontinued, all belongings taken with patient.  Pt has continuous glu

## 2021-12-08 NOTE — DISCHARGE SUMMARY
General Medicine Discharge Summary     Patient ID:  Reji Buenrostro  67year old  7/18/1945    Admit date: 12/7/2021    Discharge date and time:  12/8/21    Attending Physician: No att. providers found PM    CONTINUE these medications which have CHANGED    lisinopril 20 MG Oral Tab  Take 1 tablet (20 mg total) by mouth 2 (two) times a day., Normal, Disp-180 tablet, R-3    labetalol 100 MG Oral Tab  Take 1 tablet (100 mg total) by mouth every 8 (eight) ho Minutes    MD Brunilda Villagomez MD  [facility] Hospitalist  Pager: [phone]

## 2021-12-08 NOTE — PLAN OF CARE
Received pt at 0700. Pt is A&Ox4, no complaints of pain. Pt is on room air, lungs are clear, no cough. He is in NSR, no chest pain. Pt is continent of B&B, active bowel sounds, last BM 12-8, abdomen is non-tender & soft.  He has a CGM device on the left a control   \"get US results\" 12-8     Interventions:   - go for US  - monitor notes  - follow up with nurse & MD regarding results     - See additional Care Plan goals for specific interventions  Outcome: Progressing     Problem: CARDIOVASCULAR - ADULT  Go

## 2021-12-08 NOTE — PLAN OF CARE
Patient AOX4. O2 sat >90% on RA. NSR w 1st degree AVB on tele. VSS-normotensive. Denies chest tightness. CGM to posterior L arm, CGM consent/agreement form signed and placed in chart. Plan for renal ultrasound in a.m., NPO after midnight.  Patient updated o ultrasound  -monitor BP q4hrs  -monitor for chest tightness  - See additional Care Plan goals for specific interventions  Outcome: Progressing     Problem: CARDIOVASCULAR - ADULT  Goal: Maintains optimal cardiac output and hemodynamic stability  Descriptio

## 2021-12-23 PROBLEM — R07.9 ACUTE CHEST PAIN: Status: RESOLVED | Noted: 2021-12-07 | Resolved: 2021-12-23

## 2021-12-23 PROBLEM — I16.0 HYPERTENSIVE URGENCY: Status: RESOLVED | Noted: 2021-12-07 | Resolved: 2021-12-23

## 2022-08-15 ENCOUNTER — HOSPITAL ENCOUNTER (OUTPATIENT)
Facility: HOSPITAL | Age: 77
Setting detail: OBSERVATION
Discharge: HOME | End: 2022-08-17
Attending: EMERGENCY MEDICINE | Admitting: HOSPITALIST
Payer: MEDICARE

## 2022-08-15 DIAGNOSIS — K35.80 ACUTE APPENDICITIS, UNSPECIFIED ACUTE APPENDICITIS TYPE: Primary | ICD-10-CM

## 2022-08-15 LAB
ALBUMIN SERPL-MCNC: 3.2 G/DL (ref 3.4–5)
ALBUMIN/GLOB SERPL: 0.9 {RATIO} (ref 1–2)
ALP LIVER SERPL-CCNC: 122 U/L
ALT SERPL-CCNC: 44 U/L
ANION GAP SERPL CALC-SCNC: 3 MMOL/L (ref 0–18)
AST SERPL-CCNC: 27 U/L (ref 15–37)
BASOPHILS # BLD AUTO: 0.03 X10(3) UL (ref 0–0.2)
BASOPHILS NFR BLD AUTO: 0.2 %
BILIRUB SERPL-MCNC: 0.4 MG/DL (ref 0.1–2)
BUN BLD-MCNC: 18 MG/DL (ref 7–18)
CALCIUM BLD-MCNC: 8.7 MG/DL (ref 8.5–10.1)
CHLORIDE SERPL-SCNC: 108 MMOL/L (ref 98–112)
CO2 SERPL-SCNC: 23 MMOL/L (ref 21–32)
CREAT BLD-MCNC: 0.96 MG/DL
EOSINOPHIL # BLD AUTO: 0 X10(3) UL (ref 0–0.7)
EOSINOPHIL NFR BLD AUTO: 0 %
ERYTHROCYTE [DISTWIDTH] IN BLOOD BY AUTOMATED COUNT: 13.6 %
GFR SERPLBLD BASED ON 1.73 SQ M-ARVRAT: 81 ML/MIN/1.73M2 (ref 60–?)
GLOBULIN PLAS-MCNC: 3.5 G/DL (ref 2.8–4.4)
GLUCOSE BLD-MCNC: 215 MG/DL (ref 70–99)
GLUCOSE BLD-MCNC: 245 MG/DL (ref 70–99)
HCT VFR BLD AUTO: 39.3 %
HGB BLD-MCNC: 13.1 G/DL
IMM GRANULOCYTES # BLD AUTO: 0.05 X10(3) UL (ref 0–1)
IMM GRANULOCYTES NFR BLD: 0.3 %
LYMPHOCYTES # BLD AUTO: 0.86 X10(3) UL (ref 1–4)
LYMPHOCYTES NFR BLD AUTO: 5.1 %
MCH RBC QN AUTO: 30.1 PG (ref 26–34)
MCHC RBC AUTO-ENTMCNC: 33.3 G/DL (ref 31–37)
MCV RBC AUTO: 90.3 FL
MONOCYTES # BLD AUTO: 1.88 X10(3) UL (ref 0.1–1)
MONOCYTES NFR BLD AUTO: 11.2 %
NEUTROPHILS # BLD AUTO: 13.96 X10 (3) UL (ref 1.5–7.7)
NEUTROPHILS # BLD AUTO: 13.96 X10(3) UL (ref 1.5–7.7)
NEUTROPHILS NFR BLD AUTO: 83.2 %
OSMOLALITY SERPL CALC.SUM OF ELEC: 286 MOSM/KG (ref 275–295)
PLATELET # BLD AUTO: 213 10(3)UL (ref 150–450)
POTASSIUM SERPL-SCNC: 3.9 MMOL/L (ref 3.5–5.1)
PROT SERPL-MCNC: 6.7 G/DL (ref 6.4–8.2)
RBC # BLD AUTO: 4.35 X10(6)UL
SARS-COV-2 RNA RESP QL NAA+PROBE: NOT DETECTED
SODIUM SERPL-SCNC: 134 MMOL/L (ref 136–145)
WBC # BLD AUTO: 16.8 X10(3) UL (ref 4–11)

## 2022-08-15 PROCEDURE — 96375 TX/PRO/DX INJ NEW DRUG ADDON: CPT

## 2022-08-15 PROCEDURE — 80053 COMPREHEN METABOLIC PANEL: CPT | Performed by: EMERGENCY MEDICINE

## 2022-08-15 PROCEDURE — 99285 EMERGENCY DEPT VISIT HI MDM: CPT

## 2022-08-15 PROCEDURE — 96365 THER/PROPH/DIAG IV INF INIT: CPT

## 2022-08-15 PROCEDURE — 83036 HEMOGLOBIN GLYCOSYLATED A1C: CPT | Performed by: HOSPITALIST

## 2022-08-15 PROCEDURE — 85025 COMPLETE CBC W/AUTO DIFF WBC: CPT | Performed by: EMERGENCY MEDICINE

## 2022-08-15 PROCEDURE — 82962 GLUCOSE BLOOD TEST: CPT

## 2022-08-15 RX ORDER — MORPHINE SULFATE 2 MG/ML
2 INJECTION, SOLUTION INTRAMUSCULAR; INTRAVENOUS EVERY 2 HOUR PRN
Status: DISCONTINUED | OUTPATIENT
Start: 2022-08-15 | End: 2022-08-17

## 2022-08-15 RX ORDER — METOCLOPRAMIDE HYDROCHLORIDE 5 MG/ML
10 INJECTION INTRAMUSCULAR; INTRAVENOUS EVERY 8 HOURS PRN
Status: DISCONTINUED | OUTPATIENT
Start: 2022-08-15 | End: 2022-08-17

## 2022-08-15 RX ORDER — ATORVASTATIN CALCIUM 40 MG/1
40 TABLET, FILM COATED ORAL NIGHTLY
Status: DISCONTINUED | OUTPATIENT
Start: 2022-08-15 | End: 2022-08-17

## 2022-08-15 RX ORDER — CLOPIDOGREL BISULFATE 75 MG/1
75 TABLET ORAL DAILY
Status: DISCONTINUED | OUTPATIENT
Start: 2022-08-15 | End: 2022-08-17

## 2022-08-15 RX ORDER — DEXTROSE MONOHYDRATE 25 G/50ML
50 INJECTION, SOLUTION INTRAVENOUS
Status: DISCONTINUED | OUTPATIENT
Start: 2022-08-15 | End: 2022-08-17

## 2022-08-15 RX ORDER — ONDANSETRON 2 MG/ML
4 INJECTION INTRAMUSCULAR; INTRAVENOUS EVERY 6 HOURS PRN
Status: DISCONTINUED | OUTPATIENT
Start: 2022-08-15 | End: 2022-08-17

## 2022-08-15 RX ORDER — LABETALOL 100 MG/1
100 TABLET, FILM COATED ORAL EVERY 8 HOURS SCHEDULED
Status: DISCONTINUED | OUTPATIENT
Start: 2022-08-15 | End: 2022-08-17

## 2022-08-15 RX ORDER — ASPIRIN 81 MG/1
81 TABLET, CHEWABLE ORAL DAILY
Status: DISCONTINUED | OUTPATIENT
Start: 2022-08-15 | End: 2022-08-17

## 2022-08-15 RX ORDER — PANTOPRAZOLE SODIUM 40 MG/1
40 TABLET, DELAYED RELEASE ORAL
Status: DISCONTINUED | OUTPATIENT
Start: 2022-08-16 | End: 2022-08-17

## 2022-08-15 RX ORDER — NICOTINE POLACRILEX 4 MG
30 LOZENGE BUCCAL
Status: DISCONTINUED | OUTPATIENT
Start: 2022-08-15 | End: 2022-08-17

## 2022-08-15 RX ORDER — ACETAMINOPHEN 500 MG
500 TABLET ORAL EVERY 4 HOURS PRN
Status: DISCONTINUED | OUTPATIENT
Start: 2022-08-15 | End: 2022-08-17

## 2022-08-15 RX ORDER — HEPARIN SODIUM 5000 [USP'U]/ML
5000 INJECTION, SOLUTION INTRAVENOUS; SUBCUTANEOUS EVERY 8 HOURS SCHEDULED
Status: DISCONTINUED | OUTPATIENT
Start: 2022-08-15 | End: 2022-08-17

## 2022-08-15 RX ORDER — NICOTINE POLACRILEX 4 MG
15 LOZENGE BUCCAL
Status: DISCONTINUED | OUTPATIENT
Start: 2022-08-15 | End: 2022-08-17

## 2022-08-15 RX ORDER — MORPHINE SULFATE 2 MG/ML
2 INJECTION, SOLUTION INTRAMUSCULAR; INTRAVENOUS ONCE
Status: COMPLETED | OUTPATIENT
Start: 2022-08-15 | End: 2022-08-15

## 2022-08-15 RX ORDER — MORPHINE SULFATE 2 MG/ML
1 INJECTION, SOLUTION INTRAMUSCULAR; INTRAVENOUS EVERY 2 HOUR PRN
Status: DISCONTINUED | OUTPATIENT
Start: 2022-08-15 | End: 2022-08-17

## 2022-08-15 RX ORDER — SODIUM CHLORIDE 9 MG/ML
INJECTION, SOLUTION INTRAVENOUS CONTINUOUS
Status: DISCONTINUED | OUTPATIENT
Start: 2022-08-15 | End: 2022-08-17

## 2022-08-15 RX ORDER — AMLODIPINE BESYLATE 5 MG/1
10 TABLET ORAL DAILY
Status: DISCONTINUED | OUTPATIENT
Start: 2022-08-16 | End: 2022-08-17

## 2022-08-15 RX ORDER — MORPHINE SULFATE 4 MG/ML
4 INJECTION, SOLUTION INTRAMUSCULAR; INTRAVENOUS EVERY 2 HOUR PRN
Status: DISCONTINUED | OUTPATIENT
Start: 2022-08-15 | End: 2022-08-17

## 2022-08-16 ENCOUNTER — ANESTHESIA EVENT (OUTPATIENT)
Dept: SURGERY | Facility: HOSPITAL | Age: 77
End: 2022-08-16
Payer: MEDICARE

## 2022-08-16 ENCOUNTER — ANESTHESIA (OUTPATIENT)
Dept: SURGERY | Facility: HOSPITAL | Age: 77
End: 2022-08-16
Payer: MEDICARE

## 2022-08-16 LAB
ANION GAP SERPL CALC-SCNC: 5 MMOL/L (ref 0–18)
ANTIBODY SCREEN: NEGATIVE
ATRIAL RATE: 57 BPM
BASOPHILS # BLD AUTO: 0.03 X10(3) UL (ref 0–0.2)
BASOPHILS NFR BLD AUTO: 0.2 %
BUN BLD-MCNC: 18 MG/DL (ref 7–18)
CALCIUM BLD-MCNC: 8.2 MG/DL (ref 8.5–10.1)
CHLORIDE SERPL-SCNC: 109 MMOL/L (ref 98–112)
CO2 SERPL-SCNC: 22 MMOL/L (ref 21–32)
CREAT BLD-MCNC: 0.96 MG/DL
EOSINOPHIL # BLD AUTO: 0 X10(3) UL (ref 0–0.7)
EOSINOPHIL NFR BLD AUTO: 0 %
ERYTHROCYTE [DISTWIDTH] IN BLOOD BY AUTOMATED COUNT: 14 %
EST. AVERAGE GLUCOSE BLD GHB EST-MCNC: 166 MG/DL (ref 68–126)
GFR SERPLBLD BASED ON 1.73 SQ M-ARVRAT: 81 ML/MIN/1.73M2 (ref 60–?)
GLUCOSE BLD-MCNC: 237 MG/DL (ref 70–99)
GLUCOSE BLD-MCNC: 244 MG/DL (ref 70–99)
GLUCOSE BLD-MCNC: 245 MG/DL (ref 70–99)
GLUCOSE BLD-MCNC: 248 MG/DL (ref 70–99)
GLUCOSE BLD-MCNC: 249 MG/DL (ref 70–99)
GLUCOSE BLD-MCNC: 271 MG/DL (ref 70–99)
HBA1C MFR BLD: 7.4 % (ref ?–5.7)
HCT VFR BLD AUTO: 37.3 %
HGB BLD-MCNC: 12.3 G/DL
IMM GRANULOCYTES # BLD AUTO: 0.05 X10(3) UL (ref 0–1)
IMM GRANULOCYTES NFR BLD: 0.3 %
LYMPHOCYTES # BLD AUTO: 1.68 X10(3) UL (ref 1–4)
LYMPHOCYTES NFR BLD AUTO: 10.3 %
MAGNESIUM SERPL-MCNC: 2.1 MG/DL (ref 1.6–2.6)
MCH RBC QN AUTO: 29.9 PG (ref 26–34)
MCHC RBC AUTO-ENTMCNC: 33 G/DL (ref 31–37)
MCV RBC AUTO: 90.5 FL
MONOCYTES # BLD AUTO: 1.98 X10(3) UL (ref 0.1–1)
MONOCYTES NFR BLD AUTO: 12.1 %
NEUTROPHILS # BLD AUTO: 12.63 X10 (3) UL (ref 1.5–7.7)
NEUTROPHILS # BLD AUTO: 12.63 X10(3) UL (ref 1.5–7.7)
NEUTROPHILS NFR BLD AUTO: 77.1 %
OSMOLALITY SERPL CALC.SUM OF ELEC: 292 MOSM/KG (ref 275–295)
P AXIS: 35 DEGREES
P-R INTERVAL: 280 MS
PLATELET # BLD AUTO: 199 10(3)UL (ref 150–450)
POTASSIUM SERPL-SCNC: 4.2 MMOL/L (ref 3.5–5.1)
Q-T INTERVAL: 462 MS
QRS DURATION: 134 MS
QTC CALCULATION (BEZET): 449 MS
R AXIS: -29 DEGREES
RBC # BLD AUTO: 4.12 X10(6)UL
RH BLOOD TYPE: POSITIVE
SODIUM SERPL-SCNC: 136 MMOL/L (ref 136–145)
T AXIS: 8 DEGREES
VENTRICULAR RATE: 57 BPM
WBC # BLD AUTO: 16.4 X10(3) UL (ref 4–11)

## 2022-08-16 PROCEDURE — 83735 ASSAY OF MAGNESIUM: CPT | Performed by: HOSPITALIST

## 2022-08-16 PROCEDURE — 88304 TISSUE EXAM BY PATHOLOGIST: CPT | Performed by: SURGERY

## 2022-08-16 PROCEDURE — 82962 GLUCOSE BLOOD TEST: CPT

## 2022-08-16 PROCEDURE — 86901 BLOOD TYPING SEROLOGIC RH(D): CPT | Performed by: PHYSICIAN ASSISTANT

## 2022-08-16 PROCEDURE — 93005 ELECTROCARDIOGRAM TRACING: CPT

## 2022-08-16 PROCEDURE — 86850 RBC ANTIBODY SCREEN: CPT | Performed by: PHYSICIAN ASSISTANT

## 2022-08-16 PROCEDURE — 87205 SMEAR GRAM STAIN: CPT | Performed by: SURGERY

## 2022-08-16 PROCEDURE — 87070 CULTURE OTHR SPECIMN AEROBIC: CPT | Performed by: SURGERY

## 2022-08-16 PROCEDURE — 0DTJ4ZZ RESECTION OF APPENDIX, PERCUTANEOUS ENDOSCOPIC APPROACH: ICD-10-PCS | Performed by: SURGERY

## 2022-08-16 PROCEDURE — 87075 CULTR BACTERIA EXCEPT BLOOD: CPT | Performed by: SURGERY

## 2022-08-16 PROCEDURE — 87176 TISSUE HOMOGENIZATION CULTR: CPT | Performed by: SURGERY

## 2022-08-16 PROCEDURE — 86900 BLOOD TYPING SEROLOGIC ABO: CPT | Performed by: PHYSICIAN ASSISTANT

## 2022-08-16 PROCEDURE — 85025 COMPLETE CBC W/AUTO DIFF WBC: CPT | Performed by: HOSPITALIST

## 2022-08-16 PROCEDURE — 93010 ELECTROCARDIOGRAM REPORT: CPT | Performed by: INTERNAL MEDICINE

## 2022-08-16 PROCEDURE — 80048 BASIC METABOLIC PNL TOTAL CA: CPT | Performed by: HOSPITALIST

## 2022-08-16 RX ORDER — HYDROMORPHONE HYDROCHLORIDE 1 MG/ML
0.2 INJECTION, SOLUTION INTRAMUSCULAR; INTRAVENOUS; SUBCUTANEOUS EVERY 5 MIN PRN
Status: DISCONTINUED | OUTPATIENT
Start: 2022-08-16 | End: 2022-08-16 | Stop reason: HOSPADM

## 2022-08-16 RX ORDER — HYDROCODONE BITARTRATE AND ACETAMINOPHEN 5; 325 MG/1; MG/1
1 TABLET ORAL EVERY 4 HOURS PRN
Status: DISCONTINUED | OUTPATIENT
Start: 2022-08-16 | End: 2022-08-17

## 2022-08-16 RX ORDER — METOCLOPRAMIDE HYDROCHLORIDE 5 MG/ML
10 INJECTION INTRAMUSCULAR; INTRAVENOUS EVERY 8 HOURS PRN
Status: DISCONTINUED | OUTPATIENT
Start: 2022-08-16 | End: 2022-08-16 | Stop reason: HOSPADM

## 2022-08-16 RX ORDER — ROCURONIUM BROMIDE 10 MG/ML
INJECTION, SOLUTION INTRAVENOUS AS NEEDED
Status: DISCONTINUED | OUTPATIENT
Start: 2022-08-16 | End: 2022-08-16 | Stop reason: SURG

## 2022-08-16 RX ORDER — DEXAMETHASONE SODIUM PHOSPHATE 4 MG/ML
VIAL (ML) INJECTION AS NEEDED
Status: DISCONTINUED | OUTPATIENT
Start: 2022-08-16 | End: 2022-08-16 | Stop reason: SURG

## 2022-08-16 RX ORDER — INSULIN ASPART 100 [IU]/ML
INJECTION, SOLUTION INTRAVENOUS; SUBCUTANEOUS
Status: COMPLETED
Start: 2022-08-16 | End: 2022-08-16

## 2022-08-16 RX ORDER — HYDROCODONE BITARTRATE AND ACETAMINOPHEN 5; 325 MG/1; MG/1
2 TABLET ORAL EVERY 4 HOURS PRN
Status: DISCONTINUED | OUTPATIENT
Start: 2022-08-16 | End: 2022-08-17

## 2022-08-16 RX ORDER — BUPIVACAINE HYDROCHLORIDE 5 MG/ML
INJECTION, SOLUTION EPIDURAL; INTRACAUDAL AS NEEDED
Status: DISCONTINUED | OUTPATIENT
Start: 2022-08-16 | End: 2022-08-16 | Stop reason: HOSPADM

## 2022-08-16 RX ORDER — NEOSTIGMINE METHYLSULFATE 1 MG/ML
INJECTION, SOLUTION INTRAVENOUS AS NEEDED
Status: DISCONTINUED | OUTPATIENT
Start: 2022-08-16 | End: 2022-08-16 | Stop reason: SURG

## 2022-08-16 RX ORDER — HYDROCODONE BITARTRATE AND ACETAMINOPHEN 5; 325 MG/1; MG/1
2 TABLET ORAL ONCE AS NEEDED
Status: DISCONTINUED | OUTPATIENT
Start: 2022-08-16 | End: 2022-08-16 | Stop reason: HOSPADM

## 2022-08-16 RX ORDER — HYDROMORPHONE HYDROCHLORIDE 1 MG/ML
0.6 INJECTION, SOLUTION INTRAMUSCULAR; INTRAVENOUS; SUBCUTANEOUS EVERY 5 MIN PRN
Status: DISCONTINUED | OUTPATIENT
Start: 2022-08-16 | End: 2022-08-16 | Stop reason: HOSPADM

## 2022-08-16 RX ORDER — METOCLOPRAMIDE HYDROCHLORIDE 5 MG/ML
INJECTION INTRAMUSCULAR; INTRAVENOUS AS NEEDED
Status: DISCONTINUED | OUTPATIENT
Start: 2022-08-16 | End: 2022-08-16 | Stop reason: SURG

## 2022-08-16 RX ORDER — NALOXONE HYDROCHLORIDE 0.4 MG/ML
80 INJECTION, SOLUTION INTRAMUSCULAR; INTRAVENOUS; SUBCUTANEOUS AS NEEDED
Status: DISCONTINUED | OUTPATIENT
Start: 2022-08-16 | End: 2022-08-16 | Stop reason: HOSPADM

## 2022-08-16 RX ORDER — INSULIN ASPART 100 [IU]/ML
INJECTION, SOLUTION INTRAVENOUS; SUBCUTANEOUS ONCE
Status: COMPLETED | OUTPATIENT
Start: 2022-08-16 | End: 2022-08-16

## 2022-08-16 RX ORDER — HYDROMORPHONE HYDROCHLORIDE 1 MG/ML
0.4 INJECTION, SOLUTION INTRAMUSCULAR; INTRAVENOUS; SUBCUTANEOUS EVERY 5 MIN PRN
Status: DISCONTINUED | OUTPATIENT
Start: 2022-08-16 | End: 2022-08-16 | Stop reason: HOSPADM

## 2022-08-16 RX ORDER — METOPROLOL TARTRATE 5 MG/5ML
2.5 INJECTION INTRAVENOUS ONCE
Status: DISCONTINUED | OUTPATIENT
Start: 2022-08-16 | End: 2022-08-16 | Stop reason: HOSPADM

## 2022-08-16 RX ORDER — LIDOCAINE HYDROCHLORIDE AND EPINEPHRINE 10; 10 MG/ML; UG/ML
INJECTION, SOLUTION INFILTRATION; PERINEURAL AS NEEDED
Status: DISCONTINUED | OUTPATIENT
Start: 2022-08-16 | End: 2022-08-16 | Stop reason: HOSPADM

## 2022-08-16 RX ORDER — HYDROCODONE BITARTRATE AND ACETAMINOPHEN 5; 325 MG/1; MG/1
1 TABLET ORAL ONCE AS NEEDED
Status: DISCONTINUED | OUTPATIENT
Start: 2022-08-16 | End: 2022-08-16 | Stop reason: HOSPADM

## 2022-08-16 RX ORDER — ONDANSETRON 2 MG/ML
INJECTION INTRAMUSCULAR; INTRAVENOUS AS NEEDED
Status: DISCONTINUED | OUTPATIENT
Start: 2022-08-16 | End: 2022-08-16 | Stop reason: SURG

## 2022-08-16 RX ORDER — GLYCOPYRROLATE 0.2 MG/ML
INJECTION, SOLUTION INTRAMUSCULAR; INTRAVENOUS AS NEEDED
Status: DISCONTINUED | OUTPATIENT
Start: 2022-08-16 | End: 2022-08-16 | Stop reason: SURG

## 2022-08-16 RX ORDER — ACETAMINOPHEN 500 MG
1000 TABLET ORAL ONCE AS NEEDED
Status: DISCONTINUED | OUTPATIENT
Start: 2022-08-16 | End: 2022-08-16 | Stop reason: HOSPADM

## 2022-08-16 RX ORDER — ONDANSETRON 2 MG/ML
4 INJECTION INTRAMUSCULAR; INTRAVENOUS EVERY 6 HOURS PRN
Status: DISCONTINUED | OUTPATIENT
Start: 2022-08-16 | End: 2022-08-16 | Stop reason: HOSPADM

## 2022-08-16 RX ORDER — SODIUM CHLORIDE, SODIUM LACTATE, POTASSIUM CHLORIDE, CALCIUM CHLORIDE 600; 310; 30; 20 MG/100ML; MG/100ML; MG/100ML; MG/100ML
INJECTION, SOLUTION INTRAVENOUS CONTINUOUS
Status: DISCONTINUED | OUTPATIENT
Start: 2022-08-16 | End: 2022-08-16 | Stop reason: HOSPADM

## 2022-08-16 RX ORDER — CEFOXITIN 2 G/1
INJECTION, POWDER, FOR SOLUTION INTRAVENOUS AS NEEDED
Status: DISCONTINUED | OUTPATIENT
Start: 2022-08-16 | End: 2022-08-16 | Stop reason: SURG

## 2022-08-16 RX ORDER — HYDROMORPHONE HYDROCHLORIDE 1 MG/ML
INJECTION, SOLUTION INTRAMUSCULAR; INTRAVENOUS; SUBCUTANEOUS
Status: COMPLETED
Start: 2022-08-16 | End: 2022-08-16

## 2022-08-16 RX ADMIN — ONDANSETRON 4 MG: 2 INJECTION INTRAMUSCULAR; INTRAVENOUS at 13:05:00

## 2022-08-16 RX ADMIN — DEXAMETHASONE SODIUM PHOSPHATE 4 MG: 4 MG/ML VIAL (ML) INJECTION at 13:05:00

## 2022-08-16 RX ADMIN — ROCURONIUM BROMIDE 40 MG: 10 INJECTION, SOLUTION INTRAVENOUS at 13:05:00

## 2022-08-16 RX ADMIN — NEOSTIGMINE METHYLSULFATE 3 MG: 1 INJECTION, SOLUTION INTRAVENOUS at 13:55:00

## 2022-08-16 RX ADMIN — GLYCOPYRROLATE 0.6 MG: 0.2 INJECTION, SOLUTION INTRAMUSCULAR; INTRAVENOUS at 13:55:00

## 2022-08-16 RX ADMIN — METOCLOPRAMIDE HYDROCHLORIDE 10 MG: 5 INJECTION INTRAMUSCULAR; INTRAVENOUS at 13:05:00

## 2022-08-16 RX ADMIN — CEFOXITIN 2 G: 2 INJECTION, POWDER, FOR SOLUTION INTRAVENOUS at 13:13:00

## 2022-08-16 NOTE — BRIEF OP NOTE
Pre-Operative Diagnosis: acute appendicitis      Post-Operative Diagnosis: same     Procedure Performed:   LAPAROSCOPIC APPENDECTOMY, POSSIBLE OPEN    Surgeon(s) and Role:     * Herb Palumbo MD - Primary    Assistant(s):  PA: KITA Arita     Surgical Findings: acute appendicitis with fibrinous exudate     Specimen: appendix     Estimated Blood Loss: Blood Output: 5 mL (8/16/2022  1:44 PM)        Craig Cruz MD  8/16/2022  1:49 PM

## 2022-08-16 NOTE — PLAN OF CARE
Problem: Patient/Family Goals  Goal: Patient/Family Long Term Goal  Description: Patient's Long Term Goal:   GO HOME    Interventions:  - NPO, IVF, IV ABX, SURGERY TODAY  - See additional Care Plan goals for specific interventions  Outcome: Progressing  Goal: Patient/Family Short Term Goal  Description: Patient's Short Term Goal:   HAVE PAIN CONTROLLED    Interventions:   - MORPHINE PRN  - See additional Care Plan goals for specific interventions  Outcome: Progressing     Problem: PAIN - ADULT  Goal: Verbalizes/displays adequate comfort level or patient's stated pain goal  Description: INTERVENTIONS:  - Encourage pt to monitor pain and request assistance  - Assess pain using appropriate pain scale  - Administer analgesics based on type and severity of pain and evaluate response  - Implement non-pharmacological measures as appropriate and evaluate response  - Consider cultural and social influences on pain and pain management  - Manage/alleviate anxiety  - Utilize distraction and/or relaxation techniques  - Monitor for opioid side effects  - Notify MD/LIP if interventions unsuccessful or patient reports new pain  - Anticipate increased pain with activity and pre-medicate as appropriate  Outcome: Progressing     Problem: RISK FOR INFECTION - ADULT  Goal: Absence of fever/infection during anticipated neutropenic period  Description: INTERVENTIONS  - Monitor WBC  - Administer growth factors as ordered  - Implement neutropenic guidelines  Outcome: Progressing     Problem: SAFETY ADULT - FALL  Goal: Free from fall injury  Description: INTERVENTIONS:  - Assess pt frequently for physical needs  - Identify cognitive and physical deficits and behaviors that affect risk of falls.   - [place] fall precautions as indicated by assessment.  - Educate pt/family on patient safety including physical limitations  - Instruct pt to call for assistance with activity based on assessment  - Modify environment to reduce risk of injury  - Provide assistive devices as appropriate  - Consider OT/PT consult to assist with strengthening/mobility  - Encourage toileting schedule  Outcome: Progressing     Problem: DISCHARGE PLANNING  Goal: Discharge to home or other facility with appropriate resources  Description: INTERVENTIONS:  - Identify barriers to discharge w/pt and caregiver  - Include patient/family/discharge partner in discharge planning  - Arrange for needed discharge resources and transportation as appropriate  - Identify discharge learning needs (meds, wound care, etc)  - Arrange for interpreters to assist at discharge as needed  - Consider post-discharge preferences of patient/family/discharge partner  - Complete POLST form as appropriate  - Assess patient's ability to be responsible for managing their own health  - Refer to Case Management Department for coordinating discharge planning if the patient needs post-hospital services based on physician/LIP order or complex needs related to functional status, cognitive ability or social support system  Outcome: Progressing

## 2022-08-16 NOTE — PROGRESS NOTES
Surgery tentatively scheduled for 1130 am tomorrow  Npo   ivf  iv antibiotics    Will see patient to discuss in am

## 2022-08-16 NOTE — PLAN OF CARE
Patient arrived from ER in stable condition. Vital signs within normal range. Patient alert x 4. Room air. Up with standby assist. IVF infusing. Patient medicated for pain see Mar. Patient oriented to room. Patient NPO. Safety precautions initiated. Problem: PAIN - ADULT  Goal: Verbalizes/displays adequate comfort level or patient's stated pain goal  Description: INTERVENTIONS:  - Encourage pt to monitor pain and request assistance  - Assess pain using appropriate pain scale  - Administer analgesics based on type and severity of pain and evaluate response  - Implement non-pharmacological measures as appropriate and evaluate response  - Consider cultural and social influences on pain and pain management  - Manage/alleviate anxiety  - Utilize distraction and/or relaxation techniques  - Monitor for opioid side effects  - Notify MD/LIP if interventions unsuccessful or patient reports new pain  - Anticipate increased pain with activity and pre-medicate as appropriate  Outcome: Progressing     Problem: RISK FOR INFECTION - ADULT  Goal: Absence of fever/infection during anticipated neutropenic period  Description: INTERVENTIONS  - Monitor WBC  - Administer growth factors as ordered  - Implement neutropenic guidelines  Outcome: Progressing     Problem: SAFETY ADULT - FALL  Goal: Free from fall injury  Description: INTERVENTIONS:  - Assess pt frequently for physical needs  - Identify cognitive and physical deficits and behaviors that affect risk of falls.   - [place] fall precautions as indicated by assessment.  - Educate pt/family on patient safety including physical limitations  - Instruct pt to call for assistance with activity based on assessment  - Modify environment to reduce risk of injury  - Provide assistive devices as appropriate  - Consider OT/PT consult to assist with strengthening/mobility  - Encourage toileting schedule  Outcome: Progressing     Problem: DISCHARGE PLANNING  Goal: Discharge to home or other facility with appropriate resources  Description: INTERVENTIONS:  - Identify barriers to discharge w/pt and caregiver  - Include patient/family/discharge partner in discharge planning  - Arrange for needed discharge resources and transportation as appropriate  - Identify discharge learning needs (meds, wound care, etc)  - Arrange for interpreters to assist at discharge as needed  - Consider post-discharge preferences of patient/family/discharge partner  - Complete POLST form as appropriate  - Assess patient's ability to be responsible for managing their own health  - Refer to Case Management Department for coordinating discharge planning if the patient needs post-hospital services based on physician/LIP order or complex needs related to functional status, cognitive ability or social support system  Outcome: Progressing

## 2022-08-16 NOTE — ED INITIAL ASSESSMENT (HPI)
Patient presents to ED via EMS with c/o confirmed appendicitis. He reports abdominal pain starting at 3am, which woke him from his sleep. Mild nausea, no fever.

## 2022-08-16 NOTE — ANESTHESIA PROCEDURE NOTES
Airway  Date/Time: 8/16/2022 1:05 AM  Urgency: elective      General Information and Staff    Patient location during procedure: OR  Anesthesiologist: Ryan Ayala MD  Performed: anesthesiologist     Indications and Patient Condition  Indications for airway management: anesthesia  Sedation level: deep  Preoxygenated: yes  Patient position: sniffing  Mask difficulty assessment: 1 - vent by mask    Final Airway Details  Final airway type: endotracheal airway      Successful airway: ETT  Cuffed: yes   Successful intubation technique: direct laryngoscopy  Endotracheal tube insertion site: oral  Blade: GlideScope  Blade size: #4  ETT size (mm): 7.5    Cormack-Lehane Classification: grade IIB - view of arytenoids or posterior of glottis only  Placement verified by: chest auscultation and capnometry   Measured from: lips  ETT to lips (cm): 21  Number of attempts at approach: 1

## 2022-08-16 NOTE — PLAN OF CARE
Back from OR via bed, s/p lap appy. Awake, a/o x4. Denies nausea, denies pain at this time. Clearing throat on and off, denies SOB. On 2 L O2 via NC. sats in the low to mid 90's. Encouraged to use IS and take deep breathing. IVF infusing. Lungs CTA, diminished to bases. Abdomen rounded, distended, but soft. BS hypoactive. Lap sites x 3 with gauze and TD intact, lower site with a small amount of bloody drainage, contained under Tega derm. Will monitor closely. Pt refusing to drink anything at this time, afraid he will throe up. Using swabs and ice water. Will continue to monitor.

## 2022-08-16 NOTE — ED QUICK NOTES
..Orders for admission, patient is aware of plan and ready to go upstairs. Any questions, please call ED QUINTON Salamanca at extension [phone]. Vaccinated?  yes  Type of COVID test sent: rapid  COVID Suspicion level: Low      Titratable drug(s) infusing: n/a  Rate:    LOC at time of transport: a/ox3    Other pertinent information:    CIWA score=  NIH score=

## 2022-08-17 VITALS
RESPIRATION RATE: 18 BRPM | HEART RATE: 50 BPM | DIASTOLIC BLOOD PRESSURE: 49 MMHG | SYSTOLIC BLOOD PRESSURE: 131 MMHG | OXYGEN SATURATION: 95 % | WEIGHT: 175 LBS | TEMPERATURE: 98 F | BODY MASS INDEX: 26.52 KG/M2 | HEIGHT: 68 IN

## 2022-08-17 LAB
GLUCOSE BLD-MCNC: 215 MG/DL (ref 70–99)
GLUCOSE BLD-MCNC: 269 MG/DL (ref 70–99)
GLUCOSE BLD-MCNC: 330 MG/DL (ref 70–99)

## 2022-08-17 PROCEDURE — 82962 GLUCOSE BLOOD TEST: CPT

## 2022-08-17 RX ORDER — AMOXICILLIN AND CLAVULANATE POTASSIUM 875; 125 MG/1; MG/1
1 TABLET, FILM COATED ORAL 2 TIMES DAILY
Qty: 14 TABLET | Refills: 0 | Status: SHIPPED | OUTPATIENT
Start: 2022-08-17 | End: 2022-08-24

## 2022-08-17 RX ORDER — INSULIN ASPART 100 [IU]/ML
30 INJECTION, SOLUTION INTRAVENOUS; SUBCUTANEOUS
Status: DISCONTINUED | OUTPATIENT
Start: 2022-08-17 | End: 2022-08-17

## 2022-08-17 RX ORDER — HYDROCODONE BITARTRATE AND ACETAMINOPHEN 5; 325 MG/1; MG/1
1-2 TABLET ORAL EVERY 4 HOURS PRN
Qty: 20 TABLET | Refills: 0 | Status: SHIPPED | OUTPATIENT
Start: 2022-08-17

## 2022-08-17 NOTE — OPERATIVE REPORT
[facility]    PATIENT'S NAME: Krista Marc   ATTENDING PHYSICIAN: Rupert Pinedo M.D. OPERATING PHYSICIAN: Jose Santana M.D. PATIENT ACCOUNT#:   [de-identified]    LOCATION:  [address] [place] [address]  MEDICAL RECORD #:   [id number]       YOB: 1945  ADMISSION DATE:       08/15/2022      OPERATION DATE:  08/16/2022    OPERATIVE REPORT      PREOPERATIVE DIAGNOSIS:  Acute appendicitis. POSTOPERATIVE DIAGNOSIS:  Acute appendicitis with fibrinous exudate. PROCEDURE:  Laparoscopic appendectomy. ASSISTANT SURGEON:  Janis Nelson DO. Her assistance was necessary for prepping, draping, camera holding, and suturing. ANESTHESIA:  General.    OPERATIVE TECHNIQUE:  The patient was brought into the operating room, placed on operating table in supine position. Inhalation anesthesia provided by the attending anesthesiologist.  The abdomen was prepped in usual sterile fashion. Lidocaine 1% and 0.5% Marcaine were used as a local anesthetic. I made a skin incision just above the umbilicus. The abdominal fascia was opened under direct vision. A blunt-tipped Jeanine trocar was introduced in the abdomen, and pneumoperitoneum was established. Laparoscope was inserted. I inserted two 5 mm trocars in the left side of the abdomen, both under direct laparoscopic guidance. We identified the appendix. It appeared to have possibly a local perforation with a fibrinous exudate, and the appendix was adherent to the anterior abdominal wall next to the internal ring of the right inguinal region. It appeared that some of the tip of the appendix was actually through an indirect hernia, though this was easily freed bluntly. I was able to scrape some of the fibrinous exudate off the abdominal wall, which was sent to Microbiology for culture. I was able to divide the mesoappendix with the Harmonic Scalpel.   I then transected the base of the appendix with a ROSELIA stapler, placed the appendix in a plastic bag, and retrieved it through the umbilical port. I irrigated the abdomen with Irrisept. I then irrigated again with sterile saline, aspirated any residual fluid. I inspected the staple line, which appeared to be secured, and the mesoappendix area without any signs of bleeding. At this time, the procedure was complete. The trocars were removed. The pneumoperitoneum released. The wounds were closed with absorbable sutures. Steri-Strips and sterile dressing were provided. The patient tolerated the procedure well. He was taken to recovery room for observation.     Dictated By Billy Kowalski M.D.  d: 08/16/2022 13:55:48  t: 08/16/2022 21:07:55  Lola Wylie [id number]  NLL/

## 2022-08-17 NOTE — PLAN OF CARE
Patient is A/O times, breathing on room air evenly and unlabored. Denies any pain or distress. Vitals stable. Patient ambulating around unit with ease. Per surgical team and hospitalist, ok to DC patient today. Problem: PAIN - ADULT  Goal: Verbalizes/displays adequate comfort level or patient's stated pain goal  Description: INTERVENTIONS:  - Encourage pt to monitor pain and request assistance  - Assess pain using appropriate pain scale  - Administer analgesics based on type and severity of pain and evaluate response  - Implement non-pharmacological measures as appropriate and evaluate response  - Consider cultural and social influences on pain and pain management  - Manage/alleviate anxiety  - Utilize distraction and/or relaxation techniques  - Monitor for opioid side effects  - Notify MD/LIP if interventions unsuccessful or patient reports new pain  - Anticipate increased pain with activity and pre-medicate as appropriate  Outcome: Adequate for Discharge     Problem: RISK FOR INFECTION - ADULT  Goal: Absence of fever/infection during anticipated neutropenic period  Description: INTERVENTIONS  - Monitor WBC  - Administer growth factors as ordered  - Implement neutropenic guidelines  Outcome: Adequate for Discharge     Problem: SAFETY ADULT - FALL  Goal: Free from fall injury  Description: INTERVENTIONS:  - Assess pt frequently for physical needs  - Identify cognitive and physical deficits and behaviors that affect risk of falls.   - [place] fall precautions as indicated by assessment.  - Educate pt/family on patient safety including physical limitations  - Instruct pt to call for assistance with activity based on assessment  - Modify environment to reduce risk of injury  - Provide assistive devices as appropriate  - Consider OT/PT consult to assist with strengthening/mobility  - Encourage toileting schedule  Outcome: Adequate for Discharge     Problem: DISCHARGE PLANNING  Goal: Discharge to home or other facility with appropriate resources  Description: INTERVENTIONS:  - Identify barriers to discharge w/pt and caregiver  - Include patient/family/discharge partner in discharge planning  - Arrange for needed discharge resources and transportation as appropriate  - Identify discharge learning needs (meds, wound care, etc)  - Arrange for interpreters to assist at discharge as needed  - Consider post-discharge preferences of patient/family/discharge partner  - Complete POLST form as appropriate  - Assess patient's ability to be responsible for managing their own health  - Refer to Case Management Department for coordinating discharge planning if the patient needs post-hospital services based on physician/LIP order or complex needs related to functional status, cognitive ability or social support system  Outcome: Adequate for Discharge

## 2022-08-17 NOTE — PLAN OF CARE
Patient alert x 4. Room air. Up with standby assist. IVF infusing. Patient medicated for pain see Mar. Patient on clear liquid diet tolerating well but patient wants to take it slow. 3 lap sites with gauze and tegaderm clean dry and intact. Safety precautions maintained. Problem: PAIN - ADULT  Goal: Verbalizes/displays adequate comfort level or patient's stated pain goal  Description: INTERVENTIONS:  - Encourage pt to monitor pain and request assistance  - Assess pain using appropriate pain scale  - Administer analgesics based on type and severity of pain and evaluate response  - Implement non-pharmacological measures as appropriate and evaluate response  - Consider cultural and social influences on pain and pain management  - Manage/alleviate anxiety  - Utilize distraction and/or relaxation techniques  - Monitor for opioid side effects  - Notify MD/LIP if interventions unsuccessful or patient reports new pain  - Anticipate increased pain with activity and pre-medicate as appropriate  Outcome: Progressing     Problem: RISK FOR INFECTION - ADULT  Goal: Absence of fever/infection during anticipated neutropenic period  Description: INTERVENTIONS  - Monitor WBC  - Administer growth factors as ordered  - Implement neutropenic guidelines  Outcome: Progressing     Problem: SAFETY ADULT - FALL  Goal: Free from fall injury  Description: INTERVENTIONS:  - Assess pt frequently for physical needs  - Identify cognitive and physical deficits and behaviors that affect risk of falls.   - Gary fall precautions as indicated by assessment.  - Educate pt/family on patient safety including physical limitations  - Instruct pt to call for assistance with activity based on assessment  - Modify environment to reduce risk of injury  - Provide assistive devices as appropriate  - Consider OT/PT consult to assist with strengthening/mobility  - Encourage toileting schedule  Outcome: Progressing     Problem: DISCHARGE PLANNING  Goal: Discharge to home or other facility with appropriate resources  Description: INTERVENTIONS:  - Identify barriers to discharge w/pt and caregiver  - Include patient/family/discharge partner in discharge planning  - Arrange for needed discharge resources and transportation as appropriate  - Identify discharge learning needs (meds, wound care, etc)  - Arrange for interpreters to assist at discharge as needed  - Consider post-discharge preferences of patient/family/discharge partner  - Complete POLST form as appropriate  - Assess patient's ability to be responsible for managing their own health  - Refer to Case Management Department for coordinating discharge planning if the patient needs post-hospital services based on physician/LIP order or complex needs related to functional status, cognitive ability or social support system  Outcome: Progressing

## 2022-08-17 NOTE — DISCHARGE SUMMARY
[facility]  Discharge Summary    Stacie Peck Patient Status:  Observation    1945 MRN [id number]   [facility] 3NW-A Attending Alicja Barros MD   Hosp Day # 0 PCP Ernie Baltazar MD     Date of Admission: 8/15/2022    Date of Discharge: No discharge date for patient encounter. Admitting Diagnosis: Acute appendicitis, unspecified acute appendicitis type [K35.80]    Discharge Diagnosis: Acute appendicitis with fibrinous exudate       Patient Active Problem List:     Hypertension     Hyperlipidemia     Overweight (BMI 25.0-29. 9)     Acquired keratoderma     Hammer toe     GERD (gastroesophageal reflux disease)     Personal history of colonic polyps     History of pancreatitis     Nonspecific pain lumbar region     Type 2 diabetes mellitus with diabetic neuropathy, with long-term current use of insulin (HCC)     Visual field constriction, bilateral     NSVT (nonsustained ventricular tachycardia) (HCC)     PSVT (paroxysmal supraventricular tachycardia) ([facility])     Coronary artery disease involving native coronary artery of native heart without angina pectoris     Chronic calcific pancreatitis (HCC)     Esophageal dysphagia     Stricture and stenosis of esophagus     Anxiety     Acute appendicitis     Acute appendicitis, unspecified acute appendicitis type      Procedures: Laparoscopic appendectomy           Hospital Course:  Pt was brought to the operating room and underwent a  Laparoscopic appendectomy , without incident. Pt was admitted to floor following procedure for further evaluation and management. Pt progressed well following surgery with an uneventful hospital course. Once tolerating a diet and pain adequately controlled with PO rx, pt was deemed ready for discharge.               Disposition: Home or Self Care    Discharge Condition: Good    Discharge Medications: Current Discharge Medication List    START taking these medications    amoxicillin clavulanate 875-125 MG Oral Tab  Take 1 tablet by mouth 2 (two) times daily for 7 days. Qty: 14 tablet Refills: 0    HYDROcodone-acetaminophen 5-325 MG Oral Tab  Take 1-2 tablets by mouth every 4 (four) hours as needed for Pain. Qty: 20 tablet Refills: 0      CONTINUE these medications which have NOT CHANGED    Cholecalciferol (VITAMIN D-3 OR)  Take by mouth. labetalol 100 MG Oral Tab  Take 1 tablet (100 mg total) by mouth every 8 (eight) hours. Qty: 270 tablet Refills: 3    omeprazole 20 MG Oral Capsule Delayed Release  20 mg po qAM  Qty: 90 capsule Refills: 5  Associated Diagnoses:Esophageal dysphagia; Stricture and stenosis of esophagus    Pancrelipase, Lip-Prot-Amyl, (CREON) 71391 units Oral Cap DR Particles  Take 1-2 capsules by mouth 3 (three) times daily with meals. amLODIPine Besylate 10 MG Oral Tab  Take 10 mg by mouth daily. atorvastatin 40 MG Oral Tab  Take 1 tablet (40 mg total) by mouth nightly. Qty: 90 tablet Refills: 3    aspirin 81 MG Oral Tab  Take 81 mg by mouth daily. NOVOLOG 100 UNIT/ML SC SOLN  15 breakfast 15 lunch 30 dinner    PLAVIX 75 MG OR TABS  Take 75 mg by mouth daily. LANTUS SC  Inject 50 Units into the skin every evening. nitroglycerin (NITROSTAT) 0.4 MG Sublingual SL Tab  Place 1 tablet (0.4 mg total) under the tongue every 5 (five) minutes as needed for Chest pain. Per [place]  Qty: 25 tablet Refills: 3  Associated Diagnoses:Coronary artery disease involving native coronary artery of native heart without angina pectoris; Dizziness; Essential hypertension          Follow up Visits:  Follow-up with Dr. Yas Kingston in 1 week       Addendum:   Date of Discharge: 08/17/2022        Adrian Frazier  Surgery  8/17/2022

## 2022-08-17 NOTE — PROGRESS NOTES
NURSING DISCHARGE NOTE    Discharged Home via Wheelchair. Accompanied by Family member and Support staff  Belongings Taken by patient/family   Patient discharged to home, vitals stable, denies any pain or distress. Discussed discharge instructions with patient and wife, patient and wife verbalized understanding of all instructions provided. IV removed, physical prescription handed directly to patient.

## 2022-08-18 RX ORDER — HYDROCODONE BITARTRATE AND ACETAMINOPHEN 5; 325 MG/1; MG/1
1 TABLET ORAL EVERY 4 HOURS PRN
Qty: 20 TABLET | Refills: 0 | Status: SHIPPED | OUTPATIENT
Start: 2022-08-18

## 2022-08-27 ENCOUNTER — APPOINTMENT (OUTPATIENT)
Dept: CT IMAGING | Facility: HOSPITAL | Age: 77
End: 2022-08-27
Attending: EMERGENCY MEDICINE
Payer: MEDICARE

## 2022-08-27 ENCOUNTER — HOSPITAL ENCOUNTER (EMERGENCY)
Facility: HOSPITAL | Age: 77
Discharge: HOME | End: 2022-08-27
Attending: EMERGENCY MEDICINE
Payer: MEDICARE

## 2022-08-27 VITALS
DIASTOLIC BLOOD PRESSURE: 82 MMHG | OXYGEN SATURATION: 95 % | RESPIRATION RATE: 20 BRPM | TEMPERATURE: 99 F | SYSTOLIC BLOOD PRESSURE: 143 MMHG | HEART RATE: 78 BPM

## 2022-08-27 DIAGNOSIS — R50.9 FEVER, UNSPECIFIED FEVER CAUSE: ICD-10-CM

## 2022-08-27 DIAGNOSIS — R10.30 LOWER ABDOMINAL PAIN: Primary | ICD-10-CM

## 2022-08-27 LAB
ALBUMIN SERPL-MCNC: 3.3 G/DL (ref 3.4–5)
ALBUMIN/GLOB SERPL: 0.8 {RATIO} (ref 1–2)
ALP LIVER SERPL-CCNC: 123 U/L
ALT SERPL-CCNC: 43 U/L
ANION GAP SERPL CALC-SCNC: 6 MMOL/L (ref 0–18)
AST SERPL-CCNC: 28 U/L (ref 15–37)
BASOPHILS # BLD AUTO: 0.04 X10(3) UL (ref 0–0.2)
BASOPHILS NFR BLD AUTO: 0.3 %
BILIRUB SERPL-MCNC: 0.4 MG/DL (ref 0.1–2)
BILIRUB UR QL STRIP.AUTO: NEGATIVE
BUN BLD-MCNC: 21 MG/DL (ref 7–18)
CALCIUM BLD-MCNC: 8.6 MG/DL (ref 8.5–10.1)
CHLORIDE SERPL-SCNC: 105 MMOL/L (ref 98–112)
CLARITY UR REFRACT.AUTO: CLEAR
CO2 SERPL-SCNC: 25 MMOL/L (ref 21–32)
COLOR UR AUTO: YELLOW
CREAT BLD-MCNC: 1.28 MG/DL
EOSINOPHIL # BLD AUTO: 0.07 X10(3) UL (ref 0–0.7)
EOSINOPHIL NFR BLD AUTO: 0.6 %
ERYTHROCYTE [DISTWIDTH] IN BLOOD BY AUTOMATED COUNT: 13.6 %
GFR SERPLBLD BASED ON 1.73 SQ M-ARVRAT: 57 ML/MIN/1.73M2 (ref 60–?)
GLOBULIN PLAS-MCNC: 4.1 G/DL (ref 2.8–4.4)
GLUCOSE BLD-MCNC: 254 MG/DL (ref 70–99)
GLUCOSE UR STRIP.AUTO-MCNC: 50 MG/DL
HCT VFR BLD AUTO: 44.4 %
HGB BLD-MCNC: 14.3 G/DL
IMM GRANULOCYTES # BLD AUTO: 0.05 X10(3) UL (ref 0–1)
IMM GRANULOCYTES NFR BLD: 0.4 %
KETONES UR STRIP.AUTO-MCNC: NEGATIVE MG/DL
LACTATE SERPL-SCNC: 1.5 MMOL/L (ref 0.4–2)
LEUKOCYTE ESTERASE UR QL STRIP.AUTO: NEGATIVE
LYMPHOCYTES # BLD AUTO: 0.63 X10(3) UL (ref 1–4)
LYMPHOCYTES NFR BLD AUTO: 5.4 %
MCH RBC QN AUTO: 29.2 PG (ref 26–34)
MCHC RBC AUTO-ENTMCNC: 32.2 G/DL (ref 31–37)
MCV RBC AUTO: 90.6 FL
MONOCYTES # BLD AUTO: 1.1 X10(3) UL (ref 0.1–1)
MONOCYTES NFR BLD AUTO: 9.4 %
NEUTROPHILS # BLD AUTO: 9.84 X10 (3) UL (ref 1.5–7.7)
NEUTROPHILS # BLD AUTO: 9.84 X10(3) UL (ref 1.5–7.7)
NEUTROPHILS NFR BLD AUTO: 83.9 %
NITRITE UR QL STRIP.AUTO: NEGATIVE
OSMOLALITY SERPL CALC.SUM OF ELEC: 294 MOSM/KG (ref 275–295)
PH UR STRIP.AUTO: 5 [PH] (ref 5–8)
PLATELET # BLD AUTO: 240 10(3)UL (ref 150–450)
POTASSIUM SERPL-SCNC: 4.4 MMOL/L (ref 3.5–5.1)
PROT SERPL-MCNC: 7.4 G/DL (ref 6.4–8.2)
PROT UR STRIP.AUTO-MCNC: NEGATIVE MG/DL
RBC # BLD AUTO: 4.9 X10(6)UL
RBC UR QL AUTO: NEGATIVE
SARS-COV-2 RNA RESP QL NAA+PROBE: NOT DETECTED
SODIUM SERPL-SCNC: 136 MMOL/L (ref 136–145)
SP GR UR STRIP.AUTO: 1.03 (ref 1–1.03)
UROBILINOGEN UR STRIP.AUTO-MCNC: <2 MG/DL
WBC # BLD AUTO: 11.7 X10(3) UL (ref 4–11)

## 2022-08-27 PROCEDURE — 99284 EMERGENCY DEPT VISIT MOD MDM: CPT

## 2022-08-27 PROCEDURE — 99285 EMERGENCY DEPT VISIT HI MDM: CPT

## 2022-08-27 PROCEDURE — 36415 COLL VENOUS BLD VENIPUNCTURE: CPT

## 2022-08-27 PROCEDURE — 81001 URINALYSIS AUTO W/SCOPE: CPT | Performed by: EMERGENCY MEDICINE

## 2022-08-27 PROCEDURE — 74177 CT ABD & PELVIS W/CONTRAST: CPT | Performed by: EMERGENCY MEDICINE

## 2022-08-27 PROCEDURE — 83605 ASSAY OF LACTIC ACID: CPT | Performed by: EMERGENCY MEDICINE

## 2022-08-27 PROCEDURE — 80053 COMPREHEN METABOLIC PANEL: CPT | Performed by: EMERGENCY MEDICINE

## 2022-08-27 PROCEDURE — 87040 BLOOD CULTURE FOR BACTERIA: CPT | Performed by: EMERGENCY MEDICINE

## 2022-08-27 PROCEDURE — 96360 HYDRATION IV INFUSION INIT: CPT

## 2022-08-27 PROCEDURE — 85025 COMPLETE CBC W/AUTO DIFF WBC: CPT | Performed by: EMERGENCY MEDICINE

## 2022-08-27 RX ORDER — IOHEXOL 350 MG/ML
100 INJECTION, SOLUTION INTRAVENOUS
Status: COMPLETED | OUTPATIENT
Start: 2022-08-27 | End: 2022-08-27

## 2022-08-28 NOTE — ED QUICK NOTES
Pt called his wife sine he's being discharged. Pt's most recent temp was 98.9 orally. Pt stated \"That didn't bother him (ED Physician - Dr. Jonatan Cotton). Explained to Pt that a fever is a temp of 100.4 & above. Offered to check Pt's temp rectally for accuracy, Pt declined.

## 2022-08-28 NOTE — ED INITIAL ASSESSMENT (HPI)
A&Ox3 patient p/w fever    Patient had appendectomy on 8/16 by Dr. Erin Whelan, was doing well w/ follow up appts, today developed R groin pain and fever around noon, tmax 101.     Took tylenol at 1700    RR even/NL, speaking in full clear sentences

## 2023-01-27 ENCOUNTER — LAB ENCOUNTER (OUTPATIENT)
Dept: LAB | Facility: HOSPITAL | Age: 78
End: 2023-01-27
Attending: INTERNAL MEDICINE
Payer: MEDICARE

## 2023-01-27 DIAGNOSIS — Z01.818 PRE-OP TESTING: ICD-10-CM

## 2023-01-28 LAB — SARS-COV-2 RNA RESP QL NAA+PROBE: NOT DETECTED

## 2023-02-13 NOTE — PROGRESS NOTES
Pt states is he aware that  2/20/23 EGD/Cscope was rescheduled from 1/30/23; pt states he has instructions from card for holding plavix, and is aware to hold Jardiance x4 days prior to procedure. Pt states no changes to home meds or med hx since initial PAT screen on 1/25/23.

## 2023-02-17 ENCOUNTER — LAB ENCOUNTER (OUTPATIENT)
Dept: LAB | Facility: HOSPITAL | Age: 78
End: 2023-02-17
Attending: INTERNAL MEDICINE
Payer: MEDICARE

## 2023-02-17 DIAGNOSIS — Z01.818 PRE-OP TESTING: ICD-10-CM

## 2023-02-18 LAB — SARS-COV-2 RNA RESP QL NAA+PROBE: NOT DETECTED

## 2023-02-19 NOTE — OPERATIVE REPORT
PATIENT NAME: Kassie Merlos  MRN: [id number]  DATE OF OPERATION:  2/20/23  REFERRING PHYSICIAN: Dr. Arminda Haynes  Medications:  MAC  DATE OF LAST COLONOSCOPY:  2019  PREOPERATIVE DIAGNOSIS:  cologard positive stool  Chronic gastroesophageal reflux   personal history of colon polyps  POSTOPERATIVE DIAGNOSIS:  1. Non-obstructing Schatzki's ring, biopsied. 2. 3 cm hiatal hernia. 3. Mild diffuse gastritis. Biopsies were done to assess for h pylori infection. 4. Normal duodenum  5. Diverticulosis were scattered throughout the left colon. PROCEDURE PERFORMED:               Esophagogastroduodenoscopy with biopsies               Colonoscopy     Endoscopist: Emilia Ojeda MD     PROCEDURE AND FINDINGS: The patient was placed into the left lateral decubitus after informed consent was obtained. All questions were answered. An updated history and physical were performed and an ASA score of 3 was assigned. Informed consent was obtained prior to the procedure, with review of possible complications including bleeding, infection, and missed polyps and or cancer. MAC sedation was administered. The 2can video gastroscope was introduced into the mouth and passed into the esophagus after administration of topical oral anesthesia and MAC sedation. The entire esophagus was examined and was remarkable for  a non-obstructing Schatzki's ring, which was broken up via biopsy. There was also a 3 cm hiatal hernia. .  The entire examined stomach,  including retroflexion view was visualized and was remarkable for increase in erythema and friability consistent with gastritis. Biopsies were done to assess for h pylori infection. The entire examined duodenum was visualized to the third portion and was remarkable for normal mucosa. The gastroscope was removed from the patient. The procedure was completed. The patient tolerated the procedure well. The patient was turned around and a colonoscopy was performed.   The video adult colonoscope was passed from anus to cecum. The ileocecal valve, appendiceal orfice, hepatic and splenic flexures were all well visualized. The bowel preparation on the Aronchick bowel prep score was 2. (1 - excellent > 95% mucosa seen; 2 - good - clear liquid up to 25% of the mucosa, 90% mucosa seen;  3 - fair - semisolid stool not suctioned, but 90% of the mucosa seen; 4 - poor - semisolid stool not suctioned, but < 90% mucosa seen; 5 - inadequate - repeat prep needed)     Findings included no polyps. Diverticulosis were scattered throughout the colon. On retroflexion of the scope in the anorectum, normal internal hemorrhoids were noted. The scope withdrawal from cecum to anus was 13 minutes. RECOMMENDATIONS    1. The patient will be provided with a written summary of today's endoscopic findings  2. The patient will be notified with biopsy results in 2 - 4 working days. 3. Repeat colon cancer screening via stool testing in 10 years. 4. Resume plavix today.     Manpreet Bucio MD, Gastroenterologist  Cc: Dr. Dash Tuttle

## 2023-02-20 ENCOUNTER — ANESTHESIA (OUTPATIENT)
Dept: ENDOSCOPY | Facility: HOSPITAL | Age: 78
End: 2023-02-20
Payer: MEDICARE

## 2023-02-20 ENCOUNTER — ANESTHESIA EVENT (OUTPATIENT)
Dept: ENDOSCOPY | Facility: HOSPITAL | Age: 78
End: 2023-02-20
Payer: MEDICARE

## 2023-02-20 ENCOUNTER — HOSPITAL ENCOUNTER (OUTPATIENT)
Facility: HOSPITAL | Age: 78
Setting detail: HOSPITAL OUTPATIENT SURGERY
Discharge: HOME | End: 2023-02-20
Attending: INTERNAL MEDICINE | Admitting: INTERNAL MEDICINE
Payer: MEDICARE

## 2023-02-20 VITALS
RESPIRATION RATE: 16 BRPM | WEIGHT: 170 LBS | OXYGEN SATURATION: 97 % | HEART RATE: 52 BPM | HEIGHT: 68 IN | BODY MASS INDEX: 25.76 KG/M2 | TEMPERATURE: 98 F | SYSTOLIC BLOOD PRESSURE: 136 MMHG | DIASTOLIC BLOOD PRESSURE: 105 MMHG

## 2023-02-20 DIAGNOSIS — Z01.818 PRE-OP TESTING: Primary | ICD-10-CM

## 2023-02-20 LAB — GLUCOSE BLD-MCNC: 227 MG/DL (ref 70–99)

## 2023-02-20 PROCEDURE — 0DJD8ZZ INSPECTION OF LOWER INTESTINAL TRACT, VIA NATURAL OR ARTIFICIAL OPENING ENDOSCOPIC: ICD-10-PCS | Performed by: INTERNAL MEDICINE

## 2023-02-20 PROCEDURE — 82962 GLUCOSE BLOOD TEST: CPT

## 2023-02-20 PROCEDURE — 0DB68ZX EXCISION OF STOMACH, VIA NATURAL OR ARTIFICIAL OPENING ENDOSCOPIC, DIAGNOSTIC: ICD-10-PCS | Performed by: INTERNAL MEDICINE

## 2023-02-20 PROCEDURE — 88305 TISSUE EXAM BY PATHOLOGIST: CPT | Performed by: INTERNAL MEDICINE

## 2023-02-20 PROCEDURE — 0DB58ZX EXCISION OF ESOPHAGUS, VIA NATURAL OR ARTIFICIAL OPENING ENDOSCOPIC, DIAGNOSTIC: ICD-10-PCS | Performed by: INTERNAL MEDICINE

## 2023-02-20 RX ORDER — DEXTROSE MONOHYDRATE 25 G/50ML
50 INJECTION, SOLUTION INTRAVENOUS
Status: DISCONTINUED | OUTPATIENT
Start: 2023-02-20 | End: 2023-02-20

## 2023-02-20 RX ORDER — NICOTINE POLACRILEX 4 MG
30 LOZENGE BUCCAL
Status: DISCONTINUED | OUTPATIENT
Start: 2023-02-20 | End: 2023-02-20

## 2023-02-20 RX ORDER — EMPAGLIFLOZIN 25 MG/1
12.5 TABLET, FILM COATED ORAL ONCE
COMMUNITY

## 2023-02-20 RX ORDER — SODIUM CHLORIDE, SODIUM LACTATE, POTASSIUM CHLORIDE, CALCIUM CHLORIDE 600; 310; 30; 20 MG/100ML; MG/100ML; MG/100ML; MG/100ML
INJECTION, SOLUTION INTRAVENOUS CONTINUOUS
Status: DISCONTINUED | OUTPATIENT
Start: 2023-02-20 | End: 2023-02-20

## 2023-02-20 RX ORDER — NALOXONE HYDROCHLORIDE 0.4 MG/ML
80 INJECTION, SOLUTION INTRAMUSCULAR; INTRAVENOUS; SUBCUTANEOUS AS NEEDED
Status: DISCONTINUED | OUTPATIENT
Start: 2023-02-20 | End: 2023-02-20

## 2023-02-20 RX ORDER — ALPRAZOLAM 0.25 MG/1
0.25 TABLET ORAL NIGHTLY PRN
COMMUNITY

## 2023-02-20 RX ORDER — LISINOPRIL 20 MG/1
20 TABLET ORAL 2 TIMES DAILY
COMMUNITY

## 2023-02-20 RX ORDER — LIDOCAINE HYDROCHLORIDE 10 MG/ML
INJECTION, SOLUTION EPIDURAL; INFILTRATION; INTRACAUDAL; PERINEURAL AS NEEDED
Status: DISCONTINUED | OUTPATIENT
Start: 2023-02-20 | End: 2023-02-20 | Stop reason: SURG

## 2023-02-20 RX ORDER — NICOTINE POLACRILEX 4 MG
15 LOZENGE BUCCAL
Status: DISCONTINUED | OUTPATIENT
Start: 2023-02-20 | End: 2023-02-20

## 2023-02-20 RX ADMIN — SODIUM CHLORIDE, SODIUM LACTATE, POTASSIUM CHLORIDE, CALCIUM CHLORIDE: 600; 310; 30; 20 INJECTION, SOLUTION INTRAVENOUS at 10:00:00

## 2023-02-20 RX ADMIN — LIDOCAINE HYDROCHLORIDE 5 ML: 10 INJECTION, SOLUTION EPIDURAL; INFILTRATION; INTRACAUDAL; PERINEURAL at 09:32:00

## 2023-02-20 RX ADMIN — SODIUM CHLORIDE, SODIUM LACTATE, POTASSIUM CHLORIDE, CALCIUM CHLORIDE: 600; 310; 30; 20 INJECTION, SOLUTION INTRAVENOUS at 09:27:00

## 2023-02-20 NOTE — BRIEF OP NOTE
Pre-Operative Diagnosis: POSITIVE COLORECTAL CANCER SCREENING USING COLOGUARD TEST, HEARTBURN     Post-Operative Diagnosis: EGD: Schatzki ring, hiatal hernia, gastritis COLON: diverticulosis      Procedure Performed:   COLONOSCOPY, ESOPHAGOGASTRODUODENOSCOPY (EGD) with biopsies     Surgeon(s) and Role:     * Suman Stevenson MD - Primary    Assistant(s):        Surgical Findings: see above     Specimen: see op note     Estimated Blood Loss: No data recorded    Dictation Number:  none    Rickey Saini MD  2/20/2023  10:02 AM

## 2023-02-20 NOTE — DISCHARGE INSTRUCTIONS
ENDOSCOPY DISCHARGE INSTRUCTIONS    Procedure Performed:   Gastroscopy and Colonoscopy  Endoscopist: No name on file  FINDINGS:   Esophageal stricture (narrowing in esophagus), Hiatal hernia (stomach slides up into chest through diaphragm), Gastritis (inflammation of the stomach lining), and Diverticulosis (pockets in colon that develop with age and lack of fiber intake)    MEDICATIONS:  You may resume all other medications today    DIET:  General    BIOPSIES:  Biopsies were taken (you will be notified of results in 7-10 days)      ADDITIONAL RECOMMENDATIONS:  Resume plavix today. Repeat colon cancer screening in 10 years with stool testing    Activity for remainder of today:    REST TODAY  DO NOT drive or operate heavy machinery  DO NOT drink any alcoholic beverages  DO NOT sign any legal documents or make any important decisions    After your procedure(s): It is not unusual to feel bloated or gassy . Passing gas and belching is encouraged. Lying on your left side with your knees flexed may relieve the discomfort. A hot pack to the abdomen may also help. After your gastroscopy (upper endoscopy): You may experience a slight sore throat which will subside. Throat lozenges or salt water gargle can be used.     FOLLOW-UP:  Contact the office at [phone] for follow-up appointment if needed or if you develop any of the following:    Severe abdominal pain/discomfort       Excessive bleeding or black tarry stool  Difficulty breathing or swallowing        Persistent nausea,vomiting, or a fever above 100 degrees or chills

## 2023-02-20 NOTE — ANESTHESIA POSTPROCEDURE EVALUATION
[address] Patient Status:  Hospital Outpatient Surgery   Age/Gender 65year old male MRN [id number]   Location [address] [facility] 28 Attending Yenny Ruby MD   Hosp Day # 0 PCP Kenji Brito MD       Anesthesia Post-op Note    COLONOSCOPY, ESOPHAGOGASTRODUODENOSCOPY (EGD) with biopsies     Procedure Summary     Date: 02/20/23 Room / Location: [facility] ENDOSCOPY 03 / [facility] ENDOSCOPY    Anesthesia Start: 7777 Anesthesia Stop: 1005    Procedures:       COLONOSCOPY, ESOPHAGOGASTRODUODENOSCOPY (EGD) with biopsies      ESOPHAGOGASTRODUODENOSCOPY (EGD) Diagnosis: (EGD: Schatzki ring, hiatal hernia, gastritis COLON: normal)    Surgeons: Yenny Ruby MD Anesthesiologist: Mary Zhao MD    Anesthesia Type: MAC ASA Status: 3          Anesthesia Type: MAC    Vitals Value Taken Time   BP 99/59 02/20/23 1008   Temp  02/20/23 1011   Pulse 54 02/20/23 1011   Resp 16 02/20/23 1011   SpO2 97 % 02/20/23 1011   Vitals shown include unvalidated device data. Patient Location: Endoscopy    Anesthesia Type: MAC    Airway Patency: patent    Postop Pain Control: adequate    Mental Status: preanesthetic baseline    Nausea/Vomiting: none    Cardiopulmonary/Hydration status: stable euvolemic    Complications: no apparent anesthesia related complications    Postop vital signs: stable    Dental Exam: Unchanged from Preop    Patient to be discharged home when criteria met.

## 2024-11-19 NOTE — ED PROVIDER NOTES
Patient Seen in: [facility] Emergency Department      History   Patient presents with:  Arrythmia/Palpitations    Stated Complaint: pt states he has had tachycardia for one month and got worse last night    HPI    Patient is a 24-year-old male with disease)     s/p CABG, s/p 5 stents   • Esophageal reflux    • High blood pressure    • High cholesterol    • History of obesity    • History of pancreatitis     hx silent pancreatitis (atrophy with calcifications), no problems currently   • Hyperlipidemia night  Other systems are as noted in HPI. Constitutional and vital signs reviewed. All other systems reviewed and negative except as noted above.     Physical Exam     ED Triage Vitals [04/13/20 0030]   BP (!) 162/87   Pulse 66   Resp 16   Temp    Tem Narrative: The following orders were created for panel order CBC WITH DIFFERENTIAL WITH PLATELET.   Procedure                               Abnormality         Status                     ---------                               -----------         ------ Plan     Clinical Impression:  Chest pain of uncertain etiology  (primary encounter diagnosis)    Disposition:  Admit  4/13/2020  2:10 am    Follow-up:  No follow-up provider specified.         Medications Prescribed:  Current Discharge Medication List 36.5

## 2024-11-29 ENCOUNTER — HOSPITAL ENCOUNTER (EMERGENCY)
Facility: HOSPITAL | Age: 79
Discharge: HOME | End: 2024-11-29
Attending: EMERGENCY MEDICINE
Payer: MEDICARE

## 2024-11-29 VITALS
TEMPERATURE: 98 F | SYSTOLIC BLOOD PRESSURE: 149 MMHG | DIASTOLIC BLOOD PRESSURE: 67 MMHG | HEART RATE: 74 BPM | OXYGEN SATURATION: 98 % | RESPIRATION RATE: 18 BRPM

## 2024-11-29 DIAGNOSIS — K56.41 FECAL IMPACTION (HCC): Primary | ICD-10-CM

## 2024-11-29 DIAGNOSIS — R33.9 URINARY RETENTION: ICD-10-CM

## 2024-11-29 LAB
BILIRUB UR QL STRIP.AUTO: NEGATIVE
CLARITY UR REFRACT.AUTO: CLEAR
GLUCOSE BLD-MCNC: 136 MG/DL (ref 70–99)
GLUCOSE UR STRIP.AUTO-MCNC: >1000 MG/DL
KETONES UR STRIP.AUTO-MCNC: NEGATIVE MG/DL
LEUKOCYTE ESTERASE UR QL STRIP.AUTO: NEGATIVE
NITRITE UR QL STRIP.AUTO: NEGATIVE
PH UR STRIP.AUTO: 5 [PH] (ref 5–8)
PROT UR STRIP.AUTO-MCNC: NEGATIVE MG/DL
RBC UR QL AUTO: NEGATIVE
SP GR UR STRIP.AUTO: 1.02 (ref 1–1.03)
UROBILINOGEN UR STRIP.AUTO-MCNC: NORMAL MG/DL

## 2024-11-29 PROCEDURE — 81003 URINALYSIS AUTO W/O SCOPE: CPT | Performed by: EMERGENCY MEDICINE

## 2024-11-29 PROCEDURE — 82962 GLUCOSE BLOOD TEST: CPT

## 2024-11-29 PROCEDURE — 99285 EMERGENCY DEPT VISIT HI MDM: CPT

## 2024-11-29 PROCEDURE — 99284 EMERGENCY DEPT VISIT MOD MDM: CPT

## 2024-11-29 PROCEDURE — 51702 INSERT TEMP BLADDER CATH: CPT

## 2024-11-29 RX ORDER — LIDOCAINE/PRILOCAINE 2.5 %-2.5%
CREAM (GRAM) TOPICAL ONCE
Status: COMPLETED | OUTPATIENT
Start: 2024-11-29 | End: 2024-11-29

## 2024-11-29 RX ORDER — TAMSULOSIN HYDROCHLORIDE 0.4 MG/1
0.4 CAPSULE ORAL ONCE
Status: COMPLETED | OUTPATIENT
Start: 2024-11-29 | End: 2024-11-29

## 2024-11-29 RX ORDER — SODIUM PHOSPHATE, DIBASIC AND SODIUM PHOSPHATE, MONOBASIC 7; 19 G/230ML; G/230ML
1 ENEMA RECTAL ONCE
Status: COMPLETED | OUTPATIENT
Start: 2024-11-29 | End: 2024-11-29

## 2024-11-29 RX ORDER — BISACODYL 10 MG
10 SUPPOSITORY, RECTAL RECTAL
Status: DISCONTINUED | OUTPATIENT
Start: 2024-11-29 | End: 2024-11-29

## 2024-11-29 RX ORDER — TAMSULOSIN HYDROCHLORIDE 0.4 MG/1
0.4 CAPSULE ORAL DAILY
Qty: 7 CAPSULE | Refills: 0 | Status: SHIPPED | OUTPATIENT
Start: 2024-11-29 | End: 2024-12-06

## 2024-11-29 NOTE — ED INITIAL ASSESSMENT (HPI)
Pt to ED via EMS for constipation. States he has been trying to havea BM since 0345 this morning and now also feels like he can't pee. He has the urge to go to the bathroom. AOX4, wife at bedside

## 2024-11-29 NOTE — ED PROVIDER NOTES
Patient Seen in: [facility] Emergency Department      History     Chief Complaint   Patient presents with    Constipation     Stated Complaint: constipation    Subjective:   HPI      79-year-old with a history of coronary artery disease status post CABG, CHF, diabetes, GERD, high cholesterol, hypertension, pancreatitis, NSVT presents with his wife for evaluation of difficulty with bowel movements and urination.  Some of the history is provided by his wife.  Patient states he last had a bowel movement yesterday morning and it was normal.  He states he  was able to urinate before going to bed.  He woke up around 4 AM feeling like he had to urinate and have a bowel movement.  However he sat on the toilet for about an hour and was unable to pass urine or stool.  He did have cath placed prior to my evaluation with a liter of yellow urine.  However still unable to have a bowel movement.  Has discomfort in his rectal area and feels like he needs to go.  Does have some abdominal discomfort with it as well.  Nausea but no vomiting.  No fever.      Objective:     Past Medical History:    Anesthesia complication    vocal cord spasms after appy; pt states had difficulty breathing in PACU and required extended O2    CAD (coronary artery disease)    s/p CABG, s/p multiple stents    Congestive heart disease (HCC)    Coronary atherosclerosis    Diabetes (HCC)    Esophageal reflux    History of pancreatitis    hx silent pancreatitis (atrophy with calcifications), no problems currently    Hyperlipidemia    Hypertension    Neuropathy    bilateral feet    NSVT (nonsustained ventricular tachycardia) ([facility])    Overweight (BMI 25.0-29.9)    hx obesity    Personal history of colonic polyps    Problems with swallowing    resolved as of approx 2021; had esoph dilation    PSVT (paroxysmal supraventricular tachycardia) ([facility])    Visual impairment    glasses              Past Surgical History:   Procedure Laterality Date    Angiogram   2001    Angioplasty (coronary)  2019    s/p PTCA with stent - diagonal (vein graft)    Angioplasty (coronary)  2017    PTCA previous stent    Appendectomy  2022    Bypass surgery  2001    CABG x 4 - [facility]    Cabg  08/31/2001    x 3-4    Cath bare metal stent (bms)      x 8 stents total combination of bare metal and drug eluting    Cath drug eluting stent       total of 8 stents with mix of bare metal and drug eluting    Cholecystectomy      Colonoscopy      Colonoscopy      (3/09), diverticulosis, int hem    Colonoscopy  2012    diverticulosis    Colonoscopy  2015    diverticulosis and enlarged internal hemorrhoids    Colonoscopy N/A 2015    Procedure: COLONOSCOPY;  Surgeon: Mike Vega MD;  Location:  ENDOSCOPY    Colonoscopy N/A 2019    Procedure: COLONOSCOPY;  Surgeon: Mike Vega MD;  Location:  ENDOSCOPY    Colonoscopy N/A 2023    Procedure: COLONOSCOPY, ESOPHAGOGASTRODUODENOSCOPY (EGD) with biopsies;  Surgeon: Mike Vega MD;  Location:  ENDOSCOPY    Laparoscopic cholecystectomy N/A 2015    Procedure: LAPAROSCOPIC CHOLECYSTECTOMY;  Surgeon: Phill Bustos;  Location:  MAIN OR    Other surgical history  2008    ercp x 3 with removal of pancreatic duct stones    Tonsillectomy                  Social History     Socioeconomic History    Marital status:     Number of children: 0   Occupational History    Occupation: Retired sales   Tobacco Use    Smoking status: Former     Current packs/day: 0.00     Average packs/day: 0.3 packs/day for 20.0 years (5.0 ttl pk-yrs)     Types: Cigarettes     Start date: 1965     Quit date: 1985     Years since quittin.9    Smokeless tobacco: Never   Vaping Use    Vaping status: Never Used   Substance and Sexual Activity    Alcohol use: Yes     Comment: small amount couple days a month    Drug use: No                  Physical Exam     ED Triage Vitals [24 0742]   /66   Pulse 68    Resp 18   Temp 97.5 °F (36.4 °C)   Temp src Oral   SpO2 98 %   O2 Device None (Room air)       Current Vitals:   Vital Signs  BP: 133/66  Pulse: 68  Resp: 18  Temp: 97.5 °F (36.4 °C)  Temp src: Oral    Oxygen Therapy  SpO2: 98 %  O2 Device: None (Room air)        Physical Exam  General: Patient is awake, alert in no acute distress.   HEENT:   Sclera are not icteric.  Conjunctivae within normal limits.  Mucous members are moist.   Cardiovascular: Regular rate and rhythm, normal S1-S2.  Respiratory: Lungs are clear to auscultation bilaterally.   Abdomen: Soft, nontender, nondistended.  Rectal: Large stool ball in the rectal vault no gross blood  Skin: warm and dry, no diaphoresis    ED Course     Labs Reviewed   URINALYSIS WITH CULTURE REFLEX - Abnormal; Notable for the following components:       Result Value    Glucose Urine >1000 (*)     All other components within normal limits   POCT GLUCOSE - Abnormal; Notable for the following components:    POC Glucose 136 (*)     All other components within normal limits              straight catheter placed with 1 L urine return  Dulcolax suppository, Emla and soapsuds enema ordered.  Patient passed a small amount of stool but still felt the urge to go.   Subsequently, given fleets enema.  Was able to pass a large amount of stool.  Felt much improved.  First dose of Flomax given in the ER     MDM      History is obtained from: Wife    Previous records reviewed as noted in HPI    Differential includes, but is not limited to, fecal impaction, bowel obstruction, urinary retention, UTI    Review of any laboratory testing: Urinalysis with glucosuria.  Accu-Chek 136    Shared decision making with the patient.  Despite decompression of the bladder and relief of the fecal impaction patient still unable to urinate spontaneously.  Likely has underlying BPH or other condition causing some chronic retention given the significant amount of urine that was retained on presentation.   Therefore Lorenzana catheter was placed and patient will be referred to urology for further evaluation.  His wife was able to schedule an appointment on 12/2    Also advised to start a bowel regimen to avoid further fecal impaction    The administration of these medications were addressed : Flomax                             Medical Decision Making      Disposition and Plan     Clinical Impression:  1. Fecal impaction (HCC)    2. Urinary retention         Disposition:  Discharge  11/29/2024  1:34 pm    Follow-up:  [facility]   [address] [place] [id number]  [phone]  Schedule an appointment as soon as possible for a visit in 3 day(s)            Medications Prescribed:  Current Discharge Medication List        START taking these medications    Details   tamsulosin (FLOMAX) 0.4 MG Oral Cap Take 1 capsule (0.4 mg total) by mouth daily for 7 days.  Qty: 7 capsule, Refills: 0                 Supplementary Documentation:

## 2024-11-29 NOTE — DISCHARGE INSTRUCTIONS
Return for new or worsening symptoms such as abdominal pain, fever, vomiting    - Make sure you increse the fiber in your diet,   - take colace and MiraLax  daily  -Can use Dulcolax suppositories for any further hard stools

## 2024-12-30 RX ORDER — MULTIVIT-MIN/IRON FUM/FOLIC AC 7.5 MG-4
1 TABLET ORAL DAILY
COMMUNITY

## 2024-12-30 RX ORDER — MULTIVIT WITH MINERALS/LUTEIN
250 TABLET ORAL DAILY
COMMUNITY

## 2025-01-16 NOTE — H&P
Sam De La Torre presents for new problem evaluation and management of constipation, personal history of colon polyps and rectal bleeding. About 2 weeks ago, pt was in ER with fecal impaction and inability to urinate.  Had urinary catheter for several days, and had multiple enemas with bms.   Had recurrent constipation and was seen in [facility] on 12/3 - had negative ct abdomen and pelvis and labs.  Continued to take colace and miralax.   Bms have normalized.  Pt noted some fresh blood in stool and in bowl on several days last week.    Has some perianal tenderness.  Last colonoscopy 2/2023 - diverticulosis.   Prior hx of colon polyps.   Pt is on plavix and asa daily.  Takes creon for chronic pancreatic insufficiency - makes him constipated.        No changes in meds.   No recent travel.   No recent illnesses.   The patient was last seen 2/2023 for colonoscopy.  Labs, xrays or endoscopies since last visit include normal cbc, cmp and lipase 12/3.  Changes in patient's  medical status since last visit include none.   Recent changes in medications include none.     Sam De La Torre presents for evaluation and management of chronic heartburn complaints, personal history of colon polyps, and chronic pancreatitis.  Current symptoms include none.  Symptoms controlled with omeprazole 20 mg per day. The patient was last seen 7/2021 for dysphagia - had EGD - noted to have LA grade C esophagitis and Schatzki's ring, dilated to 18 mm, and 4 cm hiatal hernia.  Seeing Dr. Guidry for chronic pancreatitis.   Labs, xrays or endoscopies since last visit include none by me.  Changes in patient's  medical status since last visit include - had appi in 8/2022, complicated by c diff   Assessment:   Chronic gastroesophageal reflux   Schatzki's ring  Chronic pancreatitis  personal history of colon polyps                The patient present for routine follow up care for gastroesophageal reflux.  Discussed use of medications with potential side  effects, dietary changes to decrease gerd, weight loss, review of labs and prior work up.  Answered multiple questions.               Symptoms controlled on omeprazole  20 mg daily.     Plan:  1. Continue omeprazole  20 mg po qAM.  2. Cologard to be done to avoid risks of colonoscopy.  3. Diet and lifestyle changes to decrease GERD symptoms were reviewed.  4. Follow up yearly.     Time spent with patient 25'.   cc: Dr. Webb            Electronically signed by Mike Vega MD at 11/21/2022  3:03 PM        PATIENT NAME: Sam De La Torre  MRN: [id number]  DATE OF OPERATION:  2/20/23  REFERRING PHYSICIAN: Dr. Webb  Medications:  MAC  DATE OF LAST COLONOSCOPY:  2019  PREOPERATIVE DIAGNOSIS:  cologard positive stool  Chronic gastroesophageal reflux   personal history of colon polyps  POSTOPERATIVE DIAGNOSIS:  Non-obstructing Schatzki's ring, biopsied.  3 cm hiatal hernia.  Mild diffuse gastritis.  Biopsies were done to assess for h pylori infection.   Normal duodenum  Diverticulosis were scattered throughout the left colon.      PROCEDURE PERFORMED:               Esophagogastroduodenoscopy with biopsies               Colonoscopy      Endoscopist: Mike Vega MD      PROCEDURE AND FINDINGS: The patient was placed into the left lateral decubitus after informed consent was obtained.  All questions were answered.  An updated history and physical were performed and an ASA score of 3 was assigned. Informed consent was obtained prior to the procedure, with review of possible complications including bleeding, infection, and missed polyps and or cancer.  MAC sedation was administered.                  The Olympus video gastroscope was introduced into the mouth and passed into the esophagus after administration of topical oral anesthesia and MAC sedation.  The entire esophagus was examined and was remarkable for  a non-obstructing Schatzki's ring, which was broken up via biopsy.  There was also a 3 cm hiatal hernia..  The entire  examined stomach,  including retroflexion view was visualized and was remarkable for increase in erythema and friability consistent with gastritis.   Biopsies were done to assess for h pylori infection.  The entire examined duodenum was visualized to the third portion and was remarkable for normal mucosa.   The gastroscope was removed from the patient.  The procedure was completed. The patient tolerated the procedure well.             The patient was turned around and a colonoscopy was performed.  The video adult colonoscope was passed from anus to cecum.  The ileocecal valve, appendiceal orfice, hepatic and splenic flexures were all well visualized.  The bowel preparation on the Aronchick bowel prep score was 2. (1 - excellent > 95% mucosa seen; 2 - good - clear liquid up to 25% of the mucosa, 90% mucosa seen;  3 - fair - semisolid stool not suctioned, but 90% of the mucosa seen; 4 - poor - semisolid stool not suctioned, but < 90% mucosa seen; 5 - inadequate - repeat prep needed)               Findings included no polyps.  Diverticulosis were scattered throughout the colon.  On retroflexion of the scope in the anorectum, normal internal hemorrhoids were noted.               The scope withdrawal from cecum to anus was 13 minutes.     RECOMMENDATIONS      The patient will be provided with a written summary of today's endoscopic findings  The patient will be notified with biopsy results in 2 - 4 working days.   Repeat colon cancer screening via stool testing in 10 years.  Resume plavix today.     Impression     Impression   IMPRESSION:   1.  Air is noted within the urinary bladder, correlate for recent instrumentation. Sequela of   chronic urinary bladder outlet obstruction.       2.  Few colonic diverticula without evidence of diverticulitis.       3.  Stable sequela of chronic pancreatitis.                          Study Result     Narrative   DATE OF SERVICE: 12.03.2024  CT ABDOMEN+PELVIS(CONTRAST  ONLY)(CPT=74177)    CLINICAL INDICATION: Abdominal bloating.    COMPARISON STUDY: CT abdomen and pelvis 3/9/2023.    TECHNIQUE:  Multiple axial images of the abdomen and pelvis were performed from the lung bases  through the pubic symphysis after the injection of nonionic intravenous contrast. 80 mL of Isovue  370 were administered intravenously.    ADVERSE REACTION: None.    Automated exposure control and ALARA manual techniques for patient specific dose reduction were  followed while maintaining the necessary diagnostic image quality.    FINDINGS:  LUNG BASES: Evaluation is limited by motion artifact. Calcified pleural plaque along the left  hemidiaphragm.    LIVER: The liver is normal in size. There is no intrahepatic mass.    GALLBLADDER/BILIARY TREE: The patient is post cholecystectomy. There is mild pneumobilia. Minimal  intrahepatic biliary ductal dilatation is similar to prior examination.    PANCREAS: Marked parenchymal atrophy of the pancreas with diffuse dilatation of the duct measuring  0.7 cm. Few calcifications are noted.    SPLEEN: The spleen is normal in size.    ADRENAL GLANDS: The right adrenal gland is unremarkable. There is nodularity to the contour of the  left adrenal gland.    KIDNEYS: There are bilateral symmetric nephrograms without hydronephrosis. Small low density foci in  the kidneys are too small to characterize, Bosniak II.    ABDOMINAL AORTA: There is no abdominal aortic aneurysm. Atherosclerotic calcification is seen.    LYMPH NODES:  Abdomen: There is no abdominal adenopathy.    Pelvis: There is no pelvic adenopathy.    MESENTERY/RETROPERITONEUM: There is no free fluid or free air.    BOWEL: There is no bowel obstruction. Few scattered colonic diverticula without evidence of  diverticulitis. Appendix is surgically absent.    PELVIC UROGENITAL STRUCTURES: Foci of air noted within the urinary bladder. Trabeculations and mild  wall thickening noted.  The prostate is enlarged, measuring  5.3 cm transverse.    BODY WALL: Degenerative changes of the spine. Sequela of median sternotomy partially imaged. Small  volume fluid in the right inguinal canal.    Key: (S/I) = series number / image number    Impression   IMPRESSION:  1.  Air is noted within the urinary bladder, correlate for recent instrumentation. Sequela of  chronic urinary bladder outlet obstruction.    2.  Few colonic diverticula without evidence of diverticulitis.    3.  Stable sequela of chronic pancreatitis.      Allergy ;   Allergies   No Known Allergies            Current Outpatient Medications   Medication Sig Dispense Refill    tamsulosin (Flomax) cap Take 1 capsule (0.4 mg total) by mouth daily. 90 capsule 0    gabapentin 100 MG Oral Cap Take 100 mg by mouth nightly.        alendronate 70 MG Oral Tab Take 70 mg by mouth.        triamcinolone 0.1 % External Ointment Apply thin layer to affected areas on trunk and extremities twice daily, until resolved or 4 weeks max. Avoid groin and armpit areas. 15 g 0    lidocaine 5 % External Ointment Apply topically.        nitroglycerin (NITROSTAT) 0.4 MG Sublingual SL Tab Place 1 tablet (0.4 mg total) under the tongue every 5 (five) minutes as needed. Per [place] 25 tablet 3    ALPRAZolam 0.25 MG Oral Tab Take 1 tablet (0.25 mg total) by mouth nightly as needed. 30 tablet 0    Empagliflozin (JARDIANCE) 25 MG Oral Tab Take 12.5 mg by mouth daily.        lisinopril 20 MG Oral Tab Take 20 mg by mouth 2 (two) times a day.        Cholecalciferol (VITAMIN D-3 OR) Take by mouth.        labetalol 100 MG Oral Tab Take 1 tablet (100 mg total) by mouth every 8 (eight) hours. (Patient taking differently: Take 100 mg by mouth in the morning, at noon, and at bedtime.) 270 tablet 3    omeprazole 20 MG Oral Capsule Delayed Release 20 mg po qAM 90 capsule 5    Pancrelipase, Lip-Prot-Amyl, (CREON) 40512 units Oral Cap DR Particles Take 1 capsule by mouth 3 (three) times daily with meals. 2 capsules per meal         amLODIPine Besylate 10 MG Oral Tab Take 10 mg by mouth daily.        atorvastatin 40 MG Oral Tab Take 1 tablet (40 mg total) by mouth nightly. 90 tablet 3    aspirin 81 MG Oral Tab Take 81 mg by mouth daily.        NOVOLOG 100 UNIT/ML SC SOLN Inject into the skin. 8 7am and 8 at 6pm        PLAVIX 75 MG OR TABS Take 75 mg by mouth daily.          LANTUS SC Inject 40 Units into the skin every evening.               Past Medical History:   Diagnosis Date    CAD (coronary artery disease)       s/p CABG, s/p multiple stents    Diabetes ([facility])       neuropathy, microalbuminuria    Esophageal reflux      History of pancreatitis       hx silent pancreatitis (atrophy with calcifications), no problems currently    Hyperlipidemia      Hypertension      Neuropathy       bilateral feet    NSVT (nonsustained ventricular tachycardia) ([facility]) 04/15/2020    Overweight (BMI 25.0-29.9)       hx obesity    Personal history of colonic polyps      PSVT (paroxysmal supraventricular tachycardia) ([facility]) 04/15/2020            Past Surgical History:   Procedure Laterality Date    ANGIOGRAM   08/2001    ANGIOPLASTY (CORONARY)   06/24/2019     s/p PTCA with stent - diagonal (vein graft)    ANGIOPLASTY (CORONARY)   2017     PTCA previous stent    APPENDECTOMY        BYPASS SURGERY   08/31/2001     CABG x 4 - [facility]    CABG   08/31/2001     x 3-4    CATH BARE METAL STENT ([facility])         x 8 stents total combination of bare metal and drug eluting    CATH DRUG ELUTING STENT   2006      total of 8 stents with mix of bare metal and drug eluting    CHOLECYSTECTOMY        COLONOSCOPY   02/20/2023     polyp, diverticuli    COLONOSCOPY         (3/09), diverticulosis, int hem    COLONOSCOPY   04/2012     diverticulosis    COLONOSCOPY   07/23/2015     diverticulosis and enlarged internal hemorrhoids    COLONOSCOPY N/A 07/23/2015     Procedure: COLONOSCOPY;  Surgeon: Mike Vega MD;  Location:  ENDOSCOPY    COLONOSCOPY N/A 04/26/2019     Procedure:  COLONOSCOPY;  Surgeon: Mike Vega MD;  Location:  ENDOSCOPY    LAPAROSCOPIC CHOLECYSTECTOMY N/A 2015     Procedure: LAPAROSCOPIC CHOLECYSTECTOMY;  Surgeon: Phill Bustos;  Location:  MAIN OR    OTHER SURGICAL HISTORY        ercp x 3 with removal of pancreatic duct stones    TONSILLECTOMY          Social History    Tobacco Use      Smoking status: Former        Packs/day: 0.00        Years: 0.3 packs/day for 20.0 years (5.0 ttl pk-yrs)        Types: Cigarettes        Start date: 1965        Quit date: 1985        Years since quittin.9      Smokeless tobacco: Never    Vaping Use      Vaping status: Never Used    Alcohol use: Yes      Comment: small amount couple days a month    Drug use: No              Family History   Problem Relation Age of Onset    Cancer Father           Colon Cancer - 60's - ostomy    Heart Surgery Father           2 CABG surgeries    Cancer Mother           Colon Cancer - age 59, ostomy    Diabetes Brother           GEN: feels well otherwise, no fevers or chills, no weight loss    SKIN: denies any unusual skin lesions  HEENT: denies jaundice    LUNGS: denies shortness of breath   CV: denies chest pain  GI: denies dysphagia, nausea,vomitting  : denies hematuria    MSK: denies joint pain  NEURO: denies numbness,tingling   PSY: denies depression  ENDOCR: denies diabetes or thyroid history  There were no vitals taken for this visit. There is no height or weight on file to calculate BMI.  GENERAL: well developed, well nourished, in no apparent distress   SKIN: no rashes, no suspicious lesions  HEENT: non-icteric    NECK: no JVD     LUNGS: clear to auscultation    CARDIO: RRR without murmur  GI: good BS's,no masses, HSM or tenderness  RECTAL:  enlarged internal hemorrhoids, no masses  EXTREMITIES: no  edema    NEURO: Alert + Oriented times three     Assessment:    personal history of colon polyps   Rectal bleeding  Constipation - acute               The pattern of  rectal bleeding is most consistent with a hemorrhoidal source.  Other etiologies of bleeding, including INFLAMMATORY BOWEL DISEASE, colon cancer, colon polyps or other types of colitis should be ruled out with colonoscopy.                Prior hx of polyps.               Pt prone to constipation when over replaced with pancreatic enzymes, used for pancreatic insufficiency related to chronic pancreatitis.  Plan:    1. Patient will return for outpatient colonoscopy with MAC at [facility]  Risks of perforation and bleeding were reviewed.  2.  Hemorrhoidal care instructions were reinforced.  3. Will contact cardiology to approve holding plavix for 5 days prior to the endoscopy. .

## 2025-01-16 NOTE — OPERATIVE REPORT
PATIENT NAME: Sam De La Torre  MRN: [id number]  DATE OF OPERATION:  1/21/25  REFERRING PHYSICIAN: Dr. Webb  Medications:  MAC  Date of last COLONOSCOPY:  2/2023  PREOPERATIVE DIAGNOSIS                Rectal bleeding  POSTOPERATIVE DIAGNOSIS:  6 mm sigmoid colon polyp removed via cold snare.  Diverticulosis were scattered throughout the colon.              3. Enlarged internal hemorrhoids.    PROCEDURE PERFORMED:               Colonoscopy with cold snare polypectomy   Endoscopist:                                             Mike Vega MD     PROCEDURE AND FINDINGS: The patient was placed into the left lateral decubitus after informed consent was obtained.  All questions were answered.  An updated history and physical were performed and an ASA score of 3 was assigned.  Informed consent was obtained prior to the procedure, with review of possible complications including bleeding, infection, and missed polyps and or cancer.  MAC sedation was administered.    A digital rectal exam was performed and was normal.  The video adult colonoscope was passed from anus to cecum.  The ileocecal valve, appendiceal orfice, hepatic and splenic flexures were all well visualized.  The bowel preparation was rated on the Aronchick bowel prep scale as 2. (1 - excellent > 95% mucosa seen; 2 - good - clear liquid up to 25% of the mucosa, 90% mucosa seen;  3 - fair - semisolid stool not suctioned, but 90% of the mucosa seen; 4 - poor - semisolid stool not suctioned, but < 90% mucosa seen; 5 - inadequate - repeat prep needed) .     Findings included a 6 mm sigmoid colon polyp removed via cold snare.  Diverticulosis were scattered throughout the colon.   On retroflexion of the scope in the anorectum, enlarged internal hemorrhoids were noted.     The scope withdrawal from cecum to anus was 13 minutes.    RECOMMENDATIONS         1. The patient will be provided with a written summary of today's endoscopic findings.        2. The patient will be  notified with biopsy results in 2 - 4 working days.         3. Ok to resume plavix today.        4. Hemorrhoidal care instructions reviewed.        5. Recommendations regarding repeat colonoscopy will be made once the biopsy results are reviewed.      Mike Vega MD, Gastroenterologist  Cc: Dr. Webb

## 2025-01-21 ENCOUNTER — ANESTHESIA EVENT (OUTPATIENT)
Dept: ENDOSCOPY | Facility: HOSPITAL | Age: 80
End: 2025-01-21
Payer: MEDICARE

## 2025-01-21 ENCOUNTER — ANESTHESIA (OUTPATIENT)
Dept: ENDOSCOPY | Facility: HOSPITAL | Age: 80
End: 2025-01-21
Payer: MEDICARE

## 2025-01-21 ENCOUNTER — HOSPITAL ENCOUNTER (OUTPATIENT)
Facility: HOSPITAL | Age: 80
Setting detail: HOSPITAL OUTPATIENT SURGERY
Discharge: HOME | End: 2025-01-21
Attending: INTERNAL MEDICINE | Admitting: INTERNAL MEDICINE
Payer: MEDICARE

## 2025-01-21 VITALS
BODY MASS INDEX: 25.76 KG/M2 | OXYGEN SATURATION: 98 % | HEIGHT: 68 IN | RESPIRATION RATE: 16 BRPM | SYSTOLIC BLOOD PRESSURE: 138 MMHG | WEIGHT: 170 LBS | TEMPERATURE: 99 F | DIASTOLIC BLOOD PRESSURE: 58 MMHG | HEART RATE: 51 BPM

## 2025-01-21 LAB — GLUCOSE BLD-MCNC: 243 MG/DL (ref 70–99)

## 2025-01-21 PROCEDURE — 0DBN8ZX EXCISION OF SIGMOID COLON, VIA NATURAL OR ARTIFICIAL OPENING ENDOSCOPIC, DIAGNOSTIC: ICD-10-PCS | Performed by: INTERNAL MEDICINE

## 2025-01-21 PROCEDURE — 82962 GLUCOSE BLOOD TEST: CPT

## 2025-01-21 PROCEDURE — 88305 TISSUE EXAM BY PATHOLOGIST: CPT | Performed by: INTERNAL MEDICINE

## 2025-01-21 RX ORDER — NALOXONE HYDROCHLORIDE 0.4 MG/ML
0.08 INJECTION, SOLUTION INTRAMUSCULAR; INTRAVENOUS; SUBCUTANEOUS ONCE AS NEEDED
OUTPATIENT
Start: 2025-01-21 | End: 2025-01-21

## 2025-01-21 RX ORDER — SODIUM CHLORIDE, SODIUM LACTATE, POTASSIUM CHLORIDE, CALCIUM CHLORIDE 600; 310; 30; 20 MG/100ML; MG/100ML; MG/100ML; MG/100ML
INJECTION, SOLUTION INTRAVENOUS CONTINUOUS
OUTPATIENT
Start: 2025-01-21

## 2025-01-21 RX ORDER — DEXTROSE MONOHYDRATE 25 G/50ML
50 INJECTION, SOLUTION INTRAVENOUS
Status: DISCONTINUED | OUTPATIENT
Start: 2025-01-21 | End: 2025-01-21

## 2025-01-21 RX ORDER — SODIUM CHLORIDE, SODIUM LACTATE, POTASSIUM CHLORIDE, CALCIUM CHLORIDE 600; 310; 30; 20 MG/100ML; MG/100ML; MG/100ML; MG/100ML
INJECTION, SOLUTION INTRAVENOUS CONTINUOUS
Status: DISCONTINUED | OUTPATIENT
Start: 2025-01-21 | End: 2025-01-21

## 2025-01-21 RX ORDER — NICOTINE POLACRILEX 4 MG
15 LOZENGE BUCCAL
Status: DISCONTINUED | OUTPATIENT
Start: 2025-01-21 | End: 2025-01-21

## 2025-01-21 RX ORDER — ONDANSETRON 2 MG/ML
4 INJECTION INTRAMUSCULAR; INTRAVENOUS ONCE AS NEEDED
OUTPATIENT
Start: 2025-01-21 | End: 2025-01-21

## 2025-01-21 RX ORDER — LIDOCAINE HYDROCHLORIDE 10 MG/ML
INJECTION, SOLUTION EPIDURAL; INFILTRATION; INTRACAUDAL; PERINEURAL AS NEEDED
Status: DISCONTINUED | OUTPATIENT
Start: 2025-01-21 | End: 2025-01-21 | Stop reason: SURG

## 2025-01-21 RX ORDER — NICOTINE POLACRILEX 4 MG
30 LOZENGE BUCCAL
Status: DISCONTINUED | OUTPATIENT
Start: 2025-01-21 | End: 2025-01-21

## 2025-01-21 RX ADMIN — SODIUM CHLORIDE, SODIUM LACTATE, POTASSIUM CHLORIDE, CALCIUM CHLORIDE: 600; 310; 30; 20 INJECTION, SOLUTION INTRAVENOUS at 08:35:00

## 2025-01-21 RX ADMIN — LIDOCAINE HYDROCHLORIDE 30 MG: 10 INJECTION, SOLUTION EPIDURAL; INFILTRATION; INTRACAUDAL; PERINEURAL at 08:41:00

## 2025-01-21 RX ADMIN — SODIUM CHLORIDE, SODIUM LACTATE, POTASSIUM CHLORIDE, CALCIUM CHLORIDE: 600; 310; 30; 20 INJECTION, SOLUTION INTRAVENOUS at 09:11:00

## 2025-01-21 NOTE — DISCHARGE INSTRUCTIONS
ENDOSCOPY DISCHARGE INSTRUCTIONS    Procedure Performed:   Colonoscopy and Polypectomy  Endoscopist: No name on file  FINDINGS:   Internal/external hemorrhoids, Diverticulosis (pockets in colon that develop with age and lack of fiber intake), and Colon polyp(s) (a growth in the colon)    MEDICATIONS:  You may resume all other medications today    DIET:  General    BIOPSIES:  Biopsies were taken (you will be notified of results in 7-10 days)      ADDITIONAL RECOMMENDATIONS:  May resume plavix today.  Recommendations regarding repeat colonoscopy will be made once the biopsy results are reviewed.      Activity for remainder of today:    REST TODAY  DO NOT drive or operate heavy machinery  DO NOT drink any alcoholic beverages  DO NOT sign any legal documents or make any important decisions    After your procedure(s):  It is not unusual to feel bloated or gassy .  Passing gas and belching is encouraged. Lying on your left side with your knees flexed may relieve the discomfort. A hot pack to the abdomen may also help.    After your gastroscopy (upper endoscopy): You may experience a slight sore throat which will subside. Throat lozenges or salt water gargle can be used.    FOLLOW-UP:  Contact the office at [phone] for follow-up appointment if needed or if you develop any of the following:    Severe abdominal pain/discomfort       Excessive bleeding or black tarry stool  Difficulty breathing or swallowing        Persistent nausea,vomiting, or a fever above 100 degrees or chills

## 2025-01-21 NOTE — ANESTHESIA POSTPROCEDURE EVALUATION
[facility]    Sam De La Torre Patient Status:  Hospital Outpatient Surgery   Age/Gender 79 year old male MRN [id number]   Location [facility] Attending Mike Vega MD   Hosp Day # 0 PCP Yemi Webb MD       Anesthesia Post-op Note    COLONOSCOPY WITH COLD SNARE POLYPECTOMY    Procedure Summary       Date: 01/21/25 Room / Location:  ENDOSCOPY 03 / [facility] END[facility]COPY    Anesthesia Start: 0835 Anesthesia Stop: 0911    Procedure: COLONOSCOPY WITH COLD SNARE POLYPECTOMY Diagnosis: (POLYP, DIVERTICULOSIS, HEMORRHOIDS)    Surgeons: Mike Vega MD Anesthesiologist: Rubens Christy MD    Anesthesia Type: MAC ASA Status: 3            Anesthesia Type: MAC    Vitals Value Taken Time   /54 01/21/25 0912   Temp  01/21/25 0912   Pulse 61 01/21/25 0912   Resp 16 01/21/25 0912   SpO2 98 01/21/25 0912           Patient Location: Endoscopy    Anesthesia Type: MAC    Airway Patency: patent    Postop Pain Control: adequate    Mental Status: mildly sedated but able to meaningfully participate in the post-anesthesia evaluation    Nausea/Vomiting: none    Cardiopulmonary/Hydration status: stable euvolemic    Complications: no apparent anesthesia related complications    Postop vital signs: stable    Dental Exam: Unchanged from Postop

## 2025-01-21 NOTE — BRIEF OP NOTE
Pre-Operative Diagnosis: RECTAL BLEEDING     Post-Operative Diagnosis: POLYP, DIVERTICULOSIS, HEMORRHOIDS      Procedure Performed:   COLONOSCOPY WITH COLD SNARE POLYPECTOMY    Surgeons and Role:     * Mike Vega MD - Primary    Assistant(s):        Surgical Findings: see above     Specimen: sigmoid colon polyp     Estimated Blood Loss: No data recorded    Dictation Number:  none    Mike Vega MD  1/21/2025  9:13 AM

## 2025-01-21 NOTE — ANESTHESIA PREPROCEDURE EVALUATION
PRE-OP EVALUATION    Patient Name: Sam De La Torre    Admit Diagnosis: RECTAL BLEEDING    Pre-op Diagnosis: RECTAL BLEEDING    COLONOSCOPY    Anesthesia Procedure: COLONOSCOPY    Surgeons and Role:     * Mike Vega MD - Primary    Pre-op vitals reviewed.  Temp: 98.5 °F (36.9 °C)  Pulse: 54  Resp: 20  BP: 136/54  SpO2: 100 %  Body mass index is 25.85 kg/m².    Current medications reviewed.  Hospital Medications:   glucose (Dex4) 15 GM/59ML oral liquid 15 g  15 g Oral Q15 Min PRN    Or    glucose (Glutose) 40% oral gel 15 g  15 g Oral Q15 Min PRN    Or    glucose-vitamin C (Dex-4) chewable tab 4 tablet  4 tablet Oral Q15 Min PRN    Or    dextrose 50% injection 50 mL  50 mL Intravenous Q15 Min PRN    Or    glucose (Dex4) 15 GM/59ML oral liquid 30 g  30 g Oral Q15 Min PRN    Or    glucose (Glutose) 40% oral gel 30 g  30 g Oral Q15 Min PRN    Or    glucose-vitamin C (Dex-4) chewable tab 8 tablet  8 tablet Oral Q15 Min PRN    lactated ringers infusion   Intravenous Continuous       Outpatient Medications:   Prescriptions Prior to Admission[1]    Allergies: Patient has no known allergies.      Anesthesia Evaluation    Patient summary reviewed.    Anesthetic Complications           GI/Hepatic/Renal      (+) GERD                           Cardiovascular        Exercise tolerance: good     MET: >4      (+) hypertension   (+) hyperlipidemia  (+) CAD             (+) dysrhythmias and SVT                  Endo/Other      (+) diabetes                            Pulmonary                           Neuro/Psych        (+) anxiety                      Patient Active Problem List:     Hypertension     Hyperlipidemia     Overweight (BMI 25.0-29.9)     Acquired keratoderma     Hammer toe     GERD (gastroesophageal reflux disease)     Personal history of colonic polyps     History of pancreatitis     Nonspecific pain lumbar region     Type 2 diabetes mellitus with diabetic neuropathy, with long-term current use of insulin (HCC)      Visual field constriction, bilateral     NSVT (nonsustained ventricular tachycardia) (HCC)     PSVT (paroxysmal supraventricular tachycardia) (HCC)     Coronary artery disease involving native coronary artery of native heart without angina pectoris     Chronic calcific pancreatitis (HCC)     Esophageal dysphagia     Stricture and stenosis of esophagus     Anxiety     Acute appendicitis     Acute appendicitis, unspecified acute appendicitis type            Past Surgical History:   Procedure Laterality Date    Angiogram  08/2001    Angioplasty (coronary)  06/24/2019    s/p PTCA with stent - diagonal (vein graft)    Angioplasty (coronary)  2017    PTCA previous stent    Appendectomy  08/2022    Bypass surgery  08/31/2001    CABG x 4 - [facility]    Cabg  08/31/2001    x 3-4    Cath bare metal stent (bms)      x 8 stents total combination of bare metal and drug eluting    Cath drug eluting stent  2006     total of 8 stents with mix of bare metal and drug eluting    Cholecystectomy      Colonoscopy      Colonoscopy      (3/09), diverticulosis, int hem    Colonoscopy  04/2012    diverticulosis    Colonoscopy  07/23/2015    diverticulosis and enlarged internal hemorrhoids    Colonoscopy N/A 07/23/2015    Procedure: COLONOSCOPY;  Surgeon: Mike Vega MD;  Location:  ENDOSCOPY    Colonoscopy N/A 04/26/2019    Procedure: COLONOSCOPY;  Surgeon: Mike Vega MD;  Location:  ENDOSCOPY    Colonoscopy N/A 2/20/2023    Procedure: COLONOSCOPY, ESOPHAGOGASTRODUODENOSCOPY (EGD) with biopsies;  Surgeon: Mike Vega MD;  Location:  ENDOSCOPY    Laparoscopic cholecystectomy N/A 05/13/2015    Procedure: LAPAROSCOPIC CHOLECYSTECTOMY;  Surgeon: Phill Bustos;  Location:  MAIN OR    Other surgical history  2008    ercp x 3 with removal of pancreatic duct stones    Tonsillectomy       Social History     Socioeconomic History    Marital status:     Number of children: 0   Occupational History    Occupation: Retired sales    Tobacco Use    Smoking status: Former     Current packs/day: 0.00     Average packs/day: 0.3 packs/day for 20.0 years (5.0 ttl pk-yrs)     Types: Cigarettes     Start date: 1965     Quit date: 1985     Years since quittin.0    Smokeless tobacco: Never   Vaping Use    Vaping status: Never Used   Substance and Sexual Activity    Alcohol use: Yes     Comment: small amount couple days a month    Drug use: No     History   Drug Use No     Available pre-op labs reviewed.               Airway      Mallampati: II  Mouth opening: >3 FB  TM distance: 4 - 6 cm  Neck ROM: full Cardiovascular      Rhythm: regular  Rate: normal     Dental    Dentition appears grossly intact         Pulmonary      Breath sounds clear to auscultation bilaterally.               Other findings              ASA: 3   Plan: MAC  NPO status verified and patient meets guidelines.    Post-procedure pain management plan discussed with surgeon and patient.      Plan/risks discussed with: patient and spouse                Present on Admission:  **None**             [1]   Medications Prior to Admission   Medication Sig Dispense Refill Last Dose/Taking    Multiple Vitamins-Minerals (MULTI-VITAMIN/MINERALS) Oral Tab Take 1 tablet by mouth daily.   Taking    ascorbic acid 250 MG Oral Tab Take 1 tablet (250 mg total) by mouth daily.   Taking    [] tamsulosin (FLOMAX) 0.4 MG Oral Cap Take 1 capsule (0.4 mg total) by mouth daily for 7 days. 7 capsule 0 Taking    Empagliflozin (JARDIANCE) 25 MG Oral Tab Take 1 tablet (25 mg total) by mouth daily.   Taking    lisinopril 20 MG Oral Tab Take 1 tablet (20 mg total) by mouth 2 (two) times daily.   Taking    Cholecalciferol (VITAMIN D-3 OR) Take by mouth.   Taking    labetalol 100 MG Oral Tab Take 1 tablet (100 mg total) by mouth every 8 (eight) hours. 270 tablet 3 Taking    omeprazole 20 MG Oral Capsule Delayed Release 20 mg po qAM 90 capsule 5 Taking    Pancrelipase, Lip-Prot-Amyl, (CREON) 28138  units Oral Cap DR Particles Take 1-2 capsules by mouth 3 (three) times daily with meals.   Taking    amLODIPine Besylate 10 MG Oral Tab Take 1 tablet (10 mg total) by mouth daily.   Taking    atorvastatin 40 MG Oral Tab Take 1 tablet (40 mg total) by mouth nightly. 90 tablet 3 Taking    aspirin 81 MG Oral Tab Take 1 tablet (81 mg total) by mouth daily.   Taking    NOVOLOG 100 UNIT/ML SC SOLN Inject into the skin. 6 breakfast  0 lunch  6 dinner   Taking    PLAVIX 75 MG OR TABS Take 1 tablet (75 mg total) by mouth daily.   Taking    LANTUS SC Inject 40 Units into the skin every evening.   Taking    ALPRAZolam 0.25 MG Oral Tab Take 1 tablet (0.25 mg total) by mouth nightly as needed.       HYDROcodone-acetaminophen 5-325 MG Oral Tab Take 1-2 tablets by mouth every 4 (four) hours as needed for Pain. 20 tablet 0     nitroglycerin (NITROSTAT) 0.4 MG Sublingual SL Tab Place 1 tablet (0.4 mg total) under the tongue every 5 (five) minutes as needed for Chest pain. Per [place] 25 tablet 3

## 2025-05-05 NOTE — LETTER
-- DO NOT REPLY / DO NOT REPLY ALL --  -- This inbox is not monitored. If this was sent to the wrong provider or department, reroute message to P ECO Reroute pool. --  -- Message is from Engagement Center Operations (ECO) --    Sooner Appt Request Patient has an appointment scheduled on 05/21 with Dr. Ramirez and needs a 2wk hospital follow up. She wanted to ask if it is ok to keep the appointment that she has scheduled on 05/21. Please call back to follow up.                 Copied from CRM #[id number]. Topic: MW Messaging - MW Patient Request  >> May 5, 2025  1:11 PM Maribell NAZARIO wrote:  Anna Butterfield called requesting to send a general message to clinician.   Verified issue is NOT regarding a symptom(s) requiring routine or emergent triage. Verified another message template type and CRM does not apply.    Selected 'Wrap Up CRM' and created new Telephone Encounter after clicking 'Convert to Clinical Call'. Selected appropriate Reason for Call.  Sent Pt message template and routed as routine priority per Clinician KB page to appropriate clinician pool. Readback full message.   4/29/2019          [facility] [address] [id number]    Dear Natalie Morales,     Here are the  biopsy/pathology findings from your recent Colonoscopy : The colon polyp was a benign adenomatous polyp.      Follow-up information:  Follow

## 2025-07-03 ENCOUNTER — APPOINTMENT (OUTPATIENT)
Dept: GENERAL RADIOLOGY | Facility: HOSPITAL | Age: 80
End: 2025-07-03
Attending: EMERGENCY MEDICINE
Payer: MEDICARE

## 2025-07-03 ENCOUNTER — HOSPITAL ENCOUNTER (EMERGENCY)
Facility: HOSPITAL | Age: 80
Discharge: HOME | End: 2025-07-03
Attending: EMERGENCY MEDICINE
Payer: MEDICARE

## 2025-07-03 VITALS
BODY MASS INDEX: 25.01 KG/M2 | SYSTOLIC BLOOD PRESSURE: 129 MMHG | RESPIRATION RATE: 14 BRPM | HEIGHT: 68 IN | TEMPERATURE: 98 F | WEIGHT: 165 LBS | OXYGEN SATURATION: 98 % | DIASTOLIC BLOOD PRESSURE: 61 MMHG | HEART RATE: 58 BPM

## 2025-07-03 DIAGNOSIS — S50.01XA CONTUSION OF RIGHT ELBOW, INITIAL ENCOUNTER: ICD-10-CM

## 2025-07-03 DIAGNOSIS — S20.211A BRUISED RIBS, RIGHT, INITIAL ENCOUNTER: Primary | ICD-10-CM

## 2025-07-03 PROCEDURE — 71101 X-RAY EXAM UNILAT RIBS/CHEST: CPT | Performed by: EMERGENCY MEDICINE

## 2025-07-03 PROCEDURE — 99284 EMERGENCY DEPT VISIT MOD MDM: CPT

## 2025-07-03 PROCEDURE — 99283 EMERGENCY DEPT VISIT LOW MDM: CPT

## 2025-07-03 PROCEDURE — 73080 X-RAY EXAM OF ELBOW: CPT | Performed by: EMERGENCY MEDICINE

## 2025-07-03 RX ORDER — HYDROCODONE BITARTRATE AND ACETAMINOPHEN 5; 325 MG/1; MG/1
1 TABLET ORAL ONCE
Refills: 0 | Status: COMPLETED | OUTPATIENT
Start: 2025-07-03 | End: 2025-07-03

## 2025-07-03 RX ORDER — HYDROCODONE BITARTRATE AND ACETAMINOPHEN 5; 325 MG/1; MG/1
1 TABLET ORAL EVERY 6 HOURS PRN
Qty: 10 TABLET | Refills: 0 | Status: SHIPPED | OUTPATIENT
Start: 2025-07-03

## 2025-07-03 NOTE — DISCHARGE INSTRUCTIONS
Tylenol for mild pain, Norco for severe pain.  Take with Colace stool softener if on Norco to avoid constipation.  Do not drive or drink alcohol if you take Norco.    May also try plain Tylenol or ibuprofen for pain.  Reserve Norco for severe pain.    May buy an abdominal Velcro binder in place across her chest to support ribs.  Recommended using a small pillow to splint your ribs when changing position or coughing.      Wash wounds twice a day with soap and water and keep clean and dry.  May apply a small layer of bacitracin over wounds twice daily until healed    Return for any problems    Recheck with primary care physician 1 week, sooner if not improving

## 2025-07-03 NOTE — ED PROVIDER NOTES
Patient Seen in: [facility]        History  Chief Complaint   Patient presents with    Fall     Stated Complaint: lost balance fell on concrete in garage, pain to right rib cage. takes plavix a*    Subjective:   HPI            This is an 79-year-old male past medical history of coronary disease on Plavix/aspirin, CABG, diabetes, GERD, hypertension who presents with chief complaint of fall in the garage.  Patient reports just prior to arrival he tripped while carrying groceries in from the garage.  He landed on the cement garage floor.  He of landed on his right elbow and his right ribs.  He did not hit his head.  There is no loss of consciousness.  He denies headache.  No nausea or vomiting.  No back pain.  He has an abrasion and some swelling noted to his right elbow and tenderness over his right anterior mid lateral rib area.  There is a small abrasion on his right lateral leg.  He presents here from home for further evaluation.  Last tetanus 2020 per notes.      Objective:     Past Medical History:    Anesthesia complication    vocal cord spasms after appy; pt states had difficulty breathing in PACU and required extended O2    CAD (coronary artery disease)    s/p CABG, s/p multiple stents    Congestive heart disease (HCC)    Coronary atherosclerosis    Diabetes (HCC)    Esophageal reflux    History of pancreatitis    hx silent pancreatitis (atrophy with calcifications), no problems currently    Hyperlipidemia    Hypertension    Neuropathy    bilateral feet and leg    NSVT (nonsustained ventricular tachycardia) (HCC)    Overweight (BMI 25.0-29.9)    hx obesity    Personal history of colonic polyps    Problems with swallowing    resolved as of approx 2021; had esoph dilation    PSVT (paroxysmal supraventricular tachycardia) ([facility])    Visual impairment    glasses              Past Surgical History:   Procedure Laterality Date    Angiogram  08/2001    Angioplasty (coronary)  06/24/2019     s/p PTCA with stent - diagonal (vein graft)    Angioplasty (coronary)  2017    PTCA previous stent    Appendectomy  2022    Bypass surgery  2001    CABG x 4 - E[facility]    Cabg  08/31/2001    x 3-4    Cath bare metal stent (bms)      x 8 stents total combination of bare metal and drug eluting    Cath drug eluting stent       total of 8 stents with mix of bare metal and drug eluting    Cholecystectomy      Colonoscopy      Colonoscopy      (3/09), diverticulosis, int hem    Colonoscopy  2012    diverticulosis    Colonoscopy  2015    diverticulosis and enlarged internal hemorrhoids    Colonoscopy N/A 2015    Procedure: COLONOSCOPY;  Surgeon: Mike Vega MD;  Location:  ENDOSCOPY    Colonoscopy N/A 2019    Procedure: COLONOSCOPY;  Surgeon: Mike Vega MD;  Location:  ENDOSCOPY    Colonoscopy N/A 2023    Procedure: COLONOSCOPY, ESOPHAGOGASTRODUODENOSCOPY (EGD) with biopsies;  Surgeon: Mike Vega MD;  Location:  ENDOSCOPY    Colonoscopy N/A 2025    Procedure: COLONOSCOPY WITH COLD SNARE POLYPECTOMY;  Surgeon: Mike Vega MD;  Location:  ENDOSCOPY    Laparoscopic cholecystectomy N/A 2015    Procedure: LAPAROSCOPIC CHOLECYSTECTOMY;  Surgeon: Phill Bustos;  Location:  MAIN OR    Other surgical history      ercp x 3 with removal of pancreatic duct stones    Tonsillectomy                  Social History     Socioeconomic History    Marital status:     Number of children: 0   Occupational History    Occupation: Retired sales   Tobacco Use    Smoking status: Former     Current packs/day: 0.00     Average packs/day: 0.3 packs/day for 20.0 years (5.0 ttl pk-yrs)     Types: Cigarettes     Start date: 1965     Quit date: 1985     Years since quittin.5    Smokeless tobacco: Never   Vaping Use    Vaping status: Never Used   Substance and Sexual Activity    Alcohol use: Yes     Comment: small amount couple days a month    Drug use: No                                 Physical Exam    ED Triage Vitals [07/03/25 1527]   /55   Pulse 55   Resp 18   Temp 98 °F (36.7 °C)   Temp src Temporal   SpO2 94 %   O2 Device None (Room air)       Current Vitals:   Vital Signs  BP: 135/57  Pulse: 53  Resp: 11  Temp: 98.2 °F (36.8 °C)  Temp src: Oral  MAP (mmHg): 78    Oxygen Therapy  SpO2: 96 %  O2 Device: None (Room air)            Physical Exam  GENERAL: Awake, alert oriented x3, nontoxic appearing.   SKIN: Normal, warm, and dry.  HEENT:  Pupils equally round and reactive to light. Conjuctiva clear.  Oropharynx is clear and moist.   Lungs: Clear to auscultation bilaterally with no rales, no retractions, and no wheezing.  HEART:  Regular rate and rhythm. S1 and S2. No murmurs, no rubs or gallops.   Chest: Tender over the right anterior lateral ribs.  No bruising.  No crepitus.  ABDOMEN: Soft, nontender and nondistended. Normoactive bowel sounds. No rebound. No guarding.   EXTREMITIES: Abrasion noted to right elbow with some edema.  Good range of motion.  Small abrasion noted on right lateral upper leg.  No gross deformities.  Good range of motion of upper and lower extremities.  Warm with brisk capillary refill.             ED Course  Labs Reviewed - No data to display       XR ELBOW, COMPLETE (MIN 3 VIEWS), RIGHT (CPT=73080)  Result Date: 7/3/2025  PROCEDURE: XR ELBOW, COMPLETE (MIN 3 VIEWS), RIGHT (CPT=73080) INDICATIONS: lost balance fell on concrete in garage, pain to right rib cage. takes plavix and aspirin elbow pain PATIENT STATED HISTORY: patient fell in the garage. COMPARISON: No comparisons. FINDINGS: No evidence of acute displaced fracture or dislocation. Normal mineralization. Unremarkable soft tissues. Mild enthesopathy along the medial humeral epicondyle.     CONCLUSION: No evidence of acute displaced fracture or dislocation in the right elbow. Electronically Verified and Signed by Attending Radiologist: Ghulam Kate MD 7/3/2025 4:47 PM  Workstation: [id number]    XR RIBS WITH CHEST (3 VIEWS), RIGHT  (CPT=71101)  Result Date: 7/3/2025  PROCEDURE: XR RIBS WITH CHEST (3 VIEWS), RIGHT  (CPT=71101) INDICATIONS: lost balance fell on concrete in garage, pain to right rib cage. takes plavix and aspirin PATIENT STATED HISTORY: fall. COMPARISON: 12/7/2021 FINDINGS: Chronic left-sided rib deformities are noted. Postoperative changes from sternotomy. Cardiac silhouette is normal in size. There is left basilar atelectasis and/or scarring, similar in extent since 12/7/2021. No new region of lobar consolidation is identified. No acute appearing displaced osseous rib fracture is identified. No measurable pneumothorax.     CONCLUSION: See above. Electronically Verified and Signed by Attending Radiologist: LeRoy Stromberg MD 7/3/2025 4:46 PM Workstation: [id number]                     [facility]     This is an 79-year-old male past medical history of coronary disease on Plavix/aspirin, CABG, diabetes, GERD, hypertension who presents with chief complaint of fall in the garage.  Patient reports just prior to arrival he tripped while carrying groceries in from the garage.  He landed on the cement garage floor.  He of landed on his right elbow and his right ribs.  He did not hit his head.  There is no loss of consciousness.  He denies headache.  No nausea or vomiting.  No back pain.  He has an abrasion and some swelling noted to his right elbow and tenderness over his right anterior mid lateral rib area.  There is a small abrasion on his right lateral leg.  Differential includes rib fracture/contusion, right elbow fracture versus contusion.    Patient given a Norco.  Patient's tetanus immunization is up-to-date      I independently reviewed the right rib films and the right elbow x-ray which demonstrated no obvious fractures.  No evidence of pneumothorax.  Also reviewed the radiology interpretation in agreement.    Findings were communicated to patient.      For rib  fractures I recommended Velcro binder.  He states that he got some relief from the Norco so we will prescribe a few tablets.  He can supplement with Tylenol and ibuprofen as needed.  Small pillow to splint ribs.  Return if worse.    Patient was discharged home in good condition.  Follow with primary care physician 1 week.          Medical Decision Making      Disposition and Plan     Clinical Impression:  1. Bruised ribs, right, initial encounter    2. Contusion of right elbow, initial encounter         Disposition:  Discharge  7/3/2025  5:36 pm    Follow-up:  Yemi Webb MD  [address]  [address]  [facility] [address]  [phone]    Follow up in 1 week(s)            Medications Prescribed:  Current Discharge Medication List        START taking these medications    Details   !! HYDROcodone-acetaminophen 5-325 MG Oral Tab Take 1 tablet by mouth every 6 (six) hours as needed for Pain.  Qty: 10 tablet, Refills: 0    Associated Diagnoses: Bruised ribs, right, initial encounter      Naloxone HCl 4 MG/0.1ML Nasal Liquid 4 mg by Nasal route as needed. If patient remains unresponsive, repeat dose in other nostril 2-5 minutes after first dose.  Qty: 1 kit, Refills: 0       !! - Potential duplicate medications found. Please discuss with provider.                Supplementary Documentation:

## 2025-07-03 NOTE — ED INITIAL ASSESSMENT (HPI)
Mechanical fall around 1430 today onto concrete. Pt denies hitting head or LOC. Pt complains of pain on right side of chest rib area. Pt has abrasions on right elbow.   Pt on Plavix .

## (undated) DEVICE — SNARE CAPTIFLEX MICRO-OVL OLY

## (undated) DEVICE — TISSUE RETRIEVAL SYSTEM: Brand: INZII RETRIEVAL SYSTEM

## (undated) DEVICE — THE ECHELON FLEX POWERED PLUS ARTICULATING ENDOSCOPIC LINEAR CUTTERS ARE STERILE, SINGLE PATIENT USE INSTRUMENTS THAT SIMULTANEOUSLYCUT AND STAPLE TISSUE. THERE ARE SIX STAGGERED ROWS OF STAPLES, THREE ON EITHER SIDE OF THE CUT LINE. THE ECHELON FLEX 45 POWERED PLUSINSTRUMENTS HAVE A STAPLE LINE THAT IS APPROXIMATELY 45 MM LONG AND A CUT LINE THAT IS APPROXIMATELY 42 MM LONG. THE SHAFT CAN ROTATE FREELYIN BOTH DIRECTIONS AND AN ARTICULATION MECHANISM ENABLES THE DISTAL PORTION OF THE SHAFT TO PIVOT TO FACILITATE LATERAL ACCESS TO THE OPERATIVESITE.THE INSTRUMENTS ARE PACKAGED WITH A PRIMARY LITHIUM BATTERY PACK THAT MUST BE INSTALLED PRIOR TO USE. THERE ARE SPECIFIC REQUIREMENTS FORDISPOSING OF THE BATTERY PACK. REFER TO THE BATTERY PACK DISPOSAL SECTION.THE INSTRUMENTS ARE PACKAGED WITHOUT A RELOAD AND MUST BE LOADED PRIOR TO USE. A STAPLE RETAINING CAP ON THE RELOAD PROTECTS THE STAPLE LEGPOINTS DURING SHIPPING AND TRANSPORTATION. THE INSTRUMENTS’ LOCK-OUT FEATURE IS DESIGNED TO PREVENT A USED OR IMPROPERLY INSTALLED RELOADFROM BEING REFIRED OR AN INSTRUMENT FROM BEING FIRED WITHOUT A RELOAD.: Brand: ECHELON FLEX

## (undated) DEVICE — Device: Brand: DEFENDO AIR/WATER/SUCTION AND BIOPSY VALVE

## (undated) DEVICE — FILTERLINE NASAL ADULT O2/CO2

## (undated) DEVICE — #11 STERILE BLADE: Brand: POLYMER COATED BLADES

## (undated) DEVICE — THE ECHELON, ECHELON ENDOPATH™ AND ECHELON FLEX™ FAMILIES OF ENDOSCOPIC LINEAR CUTTERS AND RELOADS ARE STERILE, SINGLE PATIENT USE INSTRUMENTS THAT SIMULTANEOUSLY CUT AND STAPLE TISSUE. THERE ARE SIX STAGGERED ROWS OF STAPLES, THREE ON EITHER SIDE OF THE CUT LINE. THE 45 MM INSTRUMENTS HAVE A STAPLE LINE THATIS APPROXIMATELY 45 MM LONG AND A CUT LINE THAT IS APPROXIMATELY 42 MM LONG. THE SHAFT CAN ROTATE FREELY IN BOTH DIRECTIONS AND AN ARTICULATION MECHANISM ON ARTICULATING INSTRUMENTS ENABLES BENDING THE DISTAL PORTIONOF THE SHAFT TO FACILITATE LATERAL ACCESS OF THE OPERATIVE SITE.THE INSTRUMENTS ARE SHIPPED WITHOUT A RELOAD AND MUST BE LOADED PRIOR TO USE. A STAPLE RETAINING CAP ON THE RELOAD PROTECTS THE STAPLE LEG POINTS DURING SHIPPING AND TRANSPORTATION. THE INSTRUMENTS’ LOCK-OUT FEATURE IS DESIGNED TO PREVENT A USED RELOAD FROM BEING REFIRED.: Brand: ECHELON ENDOPATH

## (undated) DEVICE — 3M™ RED DOT™ MONITORING ELECTRODE WITH FOAM TAPE AND STICKY GEL, 50/BAG, 20/CASE, 72/PLT 2570: Brand: RED DOT™

## (undated) DEVICE — LIGHT HANDLE

## (undated) DEVICE — 1200CC GUARDIAN II: Brand: GUARDIAN

## (undated) DEVICE — VIOLET BRAIDED (POLYGLACTIN 910), SYNTHETIC ABSORBABLE SUTURE: Brand: COATED VICRYL

## (undated) DEVICE — SUTURE PROLENE 6-0 P-3

## (undated) DEVICE — TROCAR: Brand: KII® SLEEVE

## (undated) DEVICE — FORCEP BIOPSY RJ4 LG CAP W/ND

## (undated) DEVICE — ENDOSCOPY PACK UPPER: Brand: MEDLINE INDUSTRIES, INC.

## (undated) DEVICE — SYRINGE/GUAGE ASSEMBLY

## (undated) DEVICE — LASSO POLYPECTOMY SNARE: Brand: LASSO

## (undated) DEVICE — TROCAR: Brand: KII FIOS FIRST ENTRY

## (undated) DEVICE — STERILE SYNTHETIC POLYISOPRENE POWDER-FREE SURGICAL GLOVES WITH HYDROGEL COATING, SMOOTH FINISH, STRAIGHT FINGER: Brand: PROTEXIS

## (undated) DEVICE — GLOVE BIOGEL M SURG SZ 8-1/2

## (undated) DEVICE — CATH BALLOON CRE 15-18MM 5837

## (undated) DEVICE — 10FT COMBINED O2 DELIVERY/CO2 MONITORING. FILTER WITH MICROSTREAM TYPE LUER: Brand: DUAL ADULT NASAL CANNULA

## (undated) DEVICE — ENDOSCOPY PACK - LOWER: Brand: MEDLINE INDUSTRIES, INC.

## (undated) DEVICE — SYRINGE 10ML LL CONTRL SYRINGE

## (undated) DEVICE — KENDALL SCD EXPRESS SLEEVES, KNEE LENGTH, MEDIUM: Brand: KENDALL SCD

## (undated) DEVICE — TRAP 4 CPTR CHMBR N EZ INLN

## (undated) DEVICE — SYRINGE 3ML LL TIP

## (undated) DEVICE — ANGLED MICRO-NEEDLE ELECTRODE: Brand: VALLEYLAB

## (undated) DEVICE — HARMONIC 1100 SHEARS, 36CM SHAFT LENGTH: Brand: HARMONIC

## (undated) DEVICE — APPLICATOR COTTON TIP 3 10/PK

## (undated) DEVICE — STANDARD HYPODERMIC NEEDLE,POLYPROPYLENE HUB: Brand: MONOJECT

## (undated) DEVICE — LAP CHOLE/APPY CDS-LF: Brand: MEDLINE INDUSTRIES, INC.

## (undated) DEVICE — TROCARS: Brand: KII® BALLOON BLUNT TIP SYSTEM

## (undated) DEVICE — 450 ML BOTTLE OF 0.05% CHLORHEXIDINE GLUCONATE IN 99.95% STERILE WATER FOR IRRIGATION, USP AND APPLICATOR.: Brand: IRRISEPT ANTIMICROBIAL WOUND LAVAGE

## (undated) DEVICE — 40580 - THE PINK PAD - ADVANCED TRENDELENBURG POSITIONING KIT: Brand: 40580 - THE PINK PAD - ADVANCED TRENDELENBURG POSITIONING KIT

## (undated) DEVICE — HEAD AND NECK CDS-LF: Brand: MEDLINE INDUSTRIES, INC.

## (undated) DEVICE — SLEEVE KENDALL SCD EXPRESS MED

## (undated) DEVICE — PREP BETADINE SOL 5% EYE

## (undated) DEVICE — 3M(TM) TEGADERM(TM) TRANSPARENT FILM DRESSING FRAME STYLE 9505W: Brand: 3M™ TEGADERM™

## (undated) DEVICE — UNDYED BRAIDED (POLYGLACTIN 910), SYNTHETIC ABSORBABLE SUTURE: Brand: COATED VICRYL

## (undated) DEVICE — SOL  .9 1000ML BTL

## (undated) DEVICE — 3M™ TEGADERM™ TRANSPARENT FILM DRESSING, 1626W, 4 IN X 4-3/4 IN (10 CM X 12 CM), 50 EACH/CARTON, 4 CARTON/CASE: Brand: 3M™ TEGADERM™

## (undated) DEVICE — TOWEL: OR BLU 80/CS: Brand: MEDICAL ACTION INDUSTRIES

## (undated) DEVICE — KIT VLV 5 PC AIR H2O SUCT BX ENDOGATOR CONN

## (undated) DEVICE — Device

## (undated) DEVICE — GAUZE SPONGES,USP TYPE VII GAUZE, 12 PLY: Brand: CURITY

## (undated) DEVICE — KIT CUSTOM ENDOPROCEDURE STERIS

## (undated) DEVICE — V2 SPECIMEN COLLECTION MANIFOLD KIT: Brand: NEPTUNE

## (undated) NOTE — IP AVS SNAPSHOT
[facility] [place] [address] [place], [address] ~ [phone]                Discharge Summary   6/1/2017    Lois La           Admission Information        Provider Department    6/1/2017 Marco Arreola MD  [facility] Rankin / [address] 1 tablet (0.4 mg total) under the tongue every 5 (five) minutes as needed for Chest pain.     Ale Wright     [    ]    [    ]    [    ]    [    ]       Demetra Quallan 100 UNIT/ML Soln   Generic drug:  insulin aspart        15 breakfast 15 lunch 20 IBUPROFEN OR ANY ASPIRIN OR ASPIRIN-LIKE PRODUCTS. If you don not have any pain, it is not necessary to take the pain medication. If you have minimal pain, take Extra-Strength Tylenol only.   For moderate pain, use prescription medications, and for severe may wear dark glasses to protect your eyes from irritation or wind, sun, and to partially mask the bruising. 3. Contact lenses may be worn eight (8) days after surgery. DO NOT excessively pull up or down on your eyelids to insert them.   Glasses may be wo Instructions and Information about Your Health     None      Follow-up Information     Follow up with Bertha Hollis MD On 6/9/2017.     Specialty:  OTOLARYNGOLOGY    Why:  For suture removal at 10:15am    Contact information:    [address] [place] [address] - If you are a smoker or have smoked in the last 12 months, we encourage you to explore options for quitting.     - If you have concerns related to behavioral health issues or thoughts of harming yourself, contact [facility] a

## (undated) NOTE — LETTER
[facility] [address], [address]  Consent for Procedure/Sedation    Date: 11/12/2017   Time: 1308      1.  I authorize the performance upon Alanis Rutledge the following:cardiac catheterization, left ventricular cineangiography, period, the physician will determine when the applicable recovery period ends for purposes of reinstating the Do Not Resuscitate (DNR) order.     Signature of Patient: ____________________________________________________    Signature of person authorized

## (undated) NOTE — LETTER
[facility] [address], [address]  Consent for Procedure/Sedation    Date: 6/23/19    Time: 1400      1.  I authorize the performance upon Lois La the following:cardiac catheterization, left ventricular cineangiography, bi period, the physician will determine when the applicable recovery period ends for purposes of reinstating the Do Not Resuscitate (DNR) order.     Signature of Patient: ____________________________________________________    Signature of person authorized

## (undated) NOTE — ED AVS SNAPSHOT
Cathy Magallon   MRN: [id number]    Department:  [facility] Emergency Department   Date of Visit:  11/4/2019           Disclosure     Insurance plans vary and the physician(s) referred by the ER may not be covered by your plan.  Please contact yo tell this physician (or your personal doctor if your instructions are to return to your personal doctor) about any new or lasting problems. The primary care or specialist physician will see patients referred from the [facility] Emergency Department.  Dre Byers

## (undated) NOTE — LETTER
7/29/2021          Christen Door  [address]    Dear Sena Boeck,       Here are the biopsy/pathology findings from your recent EGD (Upper  Endoscopy): The esophageal biopsies showed changes related to gastroesophageal reflux.